# Patient Record
Sex: FEMALE | Race: BLACK OR AFRICAN AMERICAN | NOT HISPANIC OR LATINO | Employment: STUDENT | URBAN - METROPOLITAN AREA
[De-identification: names, ages, dates, MRNs, and addresses within clinical notes are randomized per-mention and may not be internally consistent; named-entity substitution may affect disease eponyms.]

---
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
## 2024-01-02 ENCOUNTER — HOSPITAL ENCOUNTER (OUTPATIENT)
Dept: RADIOLOGY | Facility: HOSPITAL | Age: 19
Discharge: HOME | End: 2024-01-02
Attending: PODIATRIST
Payer: COMMERCIAL

## 2024-01-02 ENCOUNTER — OFFICE VISIT (OUTPATIENT)
Dept: PODIATRY | Facility: CLINIC | Age: 19
End: 2024-01-02
Payer: COMMERCIAL

## 2024-01-02 VITALS — WEIGHT: 217 LBS | BODY MASS INDEX: 34.87 KG/M2 | HEIGHT: 66 IN

## 2024-01-02 DIAGNOSIS — Z09 POSTOP CHECK: ICD-10-CM

## 2024-01-02 DIAGNOSIS — M25.871 SESAMOIDITIS OF RIGHT FOOT: ICD-10-CM

## 2024-01-02 DIAGNOSIS — M79.674 PAIN OF RIGHT GREAT TOE: ICD-10-CM

## 2024-01-02 DIAGNOSIS — Z09 POSTOP CHECK: Primary | ICD-10-CM

## 2024-01-02 PROCEDURE — 73630 X-RAY EXAM OF FOOT: CPT | Mod: 26,RT,, | Performed by: RADIOLOGY

## 2024-01-02 PROCEDURE — 99999 PR PBB SHADOW E&M-EST. PATIENT-LVL III: CPT | Mod: PBBFAC,,, | Performed by: PODIATRIST

## 2024-01-02 PROCEDURE — 73630 X-RAY EXAM OF FOOT: CPT | Mod: TC,RT

## 2024-01-02 PROCEDURE — 99024 POSTOP FOLLOW-UP VISIT: CPT | Mod: S$GLB,,, | Performed by: PODIATRIST

## 2024-01-02 NOTE — PROGRESS NOTES
"Subjective:       Patient ID: Lisa De Jesus is a 18 y.o. female.    Chief Complaint: Post-op Evaluation (Post op on right foot, no pain, pt is wearing post op shoe on right foot and tennis shoes on left, nondiabetic)      HPI:  Lisa De Jesus presents to the office today, s/p 12/15/23 RLE medial sesamoid excision. WB with Sx. Shoe and/or tall CAM Walker. Does have [employer].     No results found for: "HGBA1C"    Review of patient's allergies indicates:   Allergen Reactions    Tretinoin Swelling     Facial swelling with retinoid creams        History reviewed. No pertinent past medical history.    History reviewed. No pertinent family history.    Social History     Socioeconomic History    Marital status: Single   Tobacco Use    Smoking status: Never    Smokeless tobacco: Never   Substance and Sexual Activity    Alcohol use: Never    Drug use: Never    Sexual activity: Never       Past Surgical History:   Procedure Laterality Date    FINGER SURGERY Left 2016    5th finger    SESAMOIDECTOMY Right 12/15/2023    Procedure: SESAMOIDECTOMY;  Surgeon: Wojciech Rich DPM;  [facility];  Service: Podiatry;  Laterality: Right;       Review of Systems   Constitutional:  Negative for chills, fatigue and fever.   HENT:  Negative for hearing loss.    Eyes:  Negative for photophobia and visual disturbance.   Respiratory:  Negative for cough, chest tightness, shortness of breath and wheezing.    Cardiovascular:  Negative for chest pain and palpitations.   Gastrointestinal:  Negative for constipation, diarrhea, nausea and vomiting.   Endocrine: Negative for cold intolerance and heat intolerance.   Genitourinary:  Negative for flank pain.   Musculoskeletal:  Negative for neck pain and neck stiffness.   Neurological:  Negative for light-headedness and headaches.   Psychiatric/Behavioral:  Negative for sleep disturbance.           Objective:   Ht 5' 6" (1.676 m)   Wt 98.4 kg (217 lb)   BMI 35.02 kg/m² "       Physical Exam  LOWER EXTREMITY PHYSICAL EXAMINATION  DERMATOLOGY: No evidence of wound healing complications are noted.     ORTHOPEDIC: Moderate edema is noted. Mild HAV is noted. Mild to moderate discomfort to palpation.      Assessment:     1. Postop check    2. Sesamoiditis of right foot    3. Pain of right great toe          Plan:     Postop check  -     X-Ray Foot Complete Right; Future; Expected date: 01/02/2024    Sesamoiditis of right foot    Pain of right great toe  -     X-Ray Foot Complete Right; Future; Expected date: 01/02/2024      Thorough discussion is had with the patient today, concerning the diagnosis, its etiology, and the treatment algorithm at present.     Sutures are D/C at present.    Start BID wound care with MediHoney and large bandage.    WBAT with Sx Shoe or CAM Walker until at least 2/1/24.    RICE therapy.    NSAIDs as needed.     May start to shower on tomorrow afternoon.    Patient will start/continue BID warm water Epson salt soaks for duration of 15-20 minutes each time (verify water is of the appropriate temperature).     Patient will perform appropriate wound care with MediHoney thereafter.             No future appointments.

## 2024-01-22 ENCOUNTER — PATIENT MESSAGE (OUTPATIENT)
Dept: PODIATRY | Facility: CLINIC | Age: 19
End: 2024-01-22
Payer: COMMERCIAL

## 2024-01-22 DIAGNOSIS — T81.89XA DELAYED SURGICAL WOUND HEALING, INITIAL ENCOUNTER: Primary | ICD-10-CM

## 2024-01-23 DIAGNOSIS — B37.9 ANTIBIOTIC-INDUCED YEAST INFECTION: ICD-10-CM

## 2024-01-23 DIAGNOSIS — T81.89XA DELAYED SURGICAL WOUND HEALING, INITIAL ENCOUNTER: Primary | ICD-10-CM

## 2024-01-23 DIAGNOSIS — T36.95XA ANTIBIOTIC-INDUCED YEAST INFECTION: ICD-10-CM

## 2024-01-23 RX ORDER — LEVOFLOXACIN 750 MG/1
750 TABLET ORAL DAILY
Qty: 7 TABLET | Refills: 0 | Status: SHIPPED | OUTPATIENT
Start: 2024-01-23 | End: 2024-01-30

## 2024-01-23 RX ORDER — FLUCONAZOLE 150 MG/1
150 TABLET ORAL
Qty: 2 TABLET | Refills: 0 | Status: SHIPPED | OUTPATIENT
Start: 2024-01-23 | End: 2024-01-27

## 2024-01-23 RX ORDER — SULFAMETHOXAZOLE AND TRIMETHOPRIM 800; 160 MG/1; MG/1
1 TABLET ORAL 2 TIMES DAILY
Qty: 20 TABLET | Refills: 0 | Status: SHIPPED | OUTPATIENT
Start: 2024-01-23 | End: 2024-02-02

## 2024-01-23 RX ORDER — MUPIROCIN 20 MG/G
OINTMENT TOPICAL 2 TIMES DAILY
Qty: 30 G | Refills: 1 | Status: SHIPPED | OUTPATIENT
Start: 2024-01-23 | End: 2024-02-22

## 2024-01-29 ENCOUNTER — OFFICE VISIT (OUTPATIENT)
Dept: PODIATRY | Facility: CLINIC | Age: 19
End: 2024-01-29
Payer: COMMERCIAL

## 2024-01-29 ENCOUNTER — EXTERNAL HOME HEALTH (OUTPATIENT)
Dept: HOME HEALTH SERVICES | Facility: HOSPITAL | Age: 19
End: 2024-01-29
Payer: COMMERCIAL

## 2024-01-29 DIAGNOSIS — M25.871 SESAMOIDITIS OF RIGHT FOOT: ICD-10-CM

## 2024-01-29 DIAGNOSIS — M79.674 PAIN OF RIGHT GREAT TOE: ICD-10-CM

## 2024-01-29 DIAGNOSIS — Z09 POSTOP CHECK: Primary | ICD-10-CM

## 2024-01-29 DIAGNOSIS — T81.89XA DELAYED SURGICAL WOUND HEALING, INITIAL ENCOUNTER: ICD-10-CM

## 2024-01-29 PROCEDURE — 99999 PR PBB SHADOW E&M-EST. PATIENT-LVL II: CPT | Mod: PBBFAC,,, | Performed by: PODIATRIST

## 2024-01-29 PROCEDURE — 99024 POSTOP FOLLOW-UP VISIT: CPT | Mod: S$GLB,,, | Performed by: PODIATRIST

## 2024-01-29 PROCEDURE — 87070 CULTURE OTHR SPECIMN AEROBIC: CPT | Performed by: PODIATRIST

## 2024-01-29 PROCEDURE — 87075 CULTR BACTERIA EXCEPT BLOOD: CPT | Performed by: PODIATRIST

## 2024-01-29 NOTE — PROGRESS NOTES
"Subjective:       Patient ID: Lisa De Jesus is a 18 y.o. female.    Chief Complaint: Follow-up      HPI:  Lisa De Jesus presents to the office today, s/p 12/15/23 RLE medial sesamoid excision. WB with CAM Walker. Did notify me on last week concerning a wound healing complication. I did Rx. Bactroban + Bactrim + Levaquin. States improved appearance since last Tuesday PM.     No results found for: "HGBA1C"    Review of patient's allergies indicates:   Allergen Reactions    Tretinoin Swelling     Facial swelling with retinoid creams        History reviewed. No pertinent past medical history.    History reviewed. No pertinent family history.    Social History     Socioeconomic History    Marital status: Single   Tobacco Use    Smoking status: Never    Smokeless tobacco: Never   Substance and Sexual Activity    Alcohol use: Never    Drug use: Never    Sexual activity: Never       Past Surgical History:   Procedure Laterality Date    FINGER SURGERY Left 2016    5th finger    SESAMOIDECTOMY Right 12/15/2023    Procedure: SESAMOIDECTOMY;  Surgeon: Wojciech Rich DPM;  Location: [facility];  Service: Podiatry;  Laterality: Right;       Review of Systems   Constitutional:  Negative for chills, fatigue and fever.   HENT:  Negative for hearing loss.    Eyes:  Negative for photophobia and visual disturbance.   Respiratory:  Negative for cough, chest tightness, shortness of breath and wheezing.    Cardiovascular:  Negative for chest pain and palpitations.   Gastrointestinal:  Negative for constipation, diarrhea, nausea and vomiting.   Endocrine: Negative for cold intolerance and heat intolerance.   Genitourinary:  Negative for flank pain.   Musculoskeletal:  Negative for neck pain and neck stiffness.   Neurological:  Negative for light-headedness and headaches.   Psychiatric/Behavioral:  Negative for sleep disturbance.           Objective:   There were no vitals taken for this visit.          LOWER EXTREMITY PHYSICAL " EXAMINATION  DERMATOLOGY: Delayed healing surgical wound is noted. No overt infection is noted. No malodor is noted. No deep probing is noted. No bone is noted or palpation. No drainage is noted.     ORTHOPEDIC: Moderate edema is noted. Mild HAV is noted. Mild discomfort to palpation.      Assessment:     1. Postop check    2. Sesamoiditis of right foot    3. Pain of right great toe    4. Delayed surgical wound healing, initial encounter          Plan:     Postop check    Sesamoiditis of right foot    Pain of right great toe    Delayed surgical wound healing, initial encounter      Thorough discussion is had with the patient today, concerning the diagnosis, its etiology, and the treatment algorithm at present.     The wound was surgically debrided after adequate prep with alcohol and/or betadine paint. Excisional wound debridement was performed using sharp #10/#15 blade/rounded scalpel and tissue nipper, with removal of all non-viable skin and soft tissues; necrotic skin/tissue formation. The woundbase/wound bed was also debrided to encourage bleeding as to promote/stimulate healing. Debridement was excisional and included epidermal, dermal and subcutaneous tissues. Post debridement measurements are as above. Hemostasis was achieved. Patient tolerated procedure well and without complications. Local woundcare with Hydrogel and Collagen dressings and bandage thereafter.     Maintain dressings until Thursday PM and change with Bactroban alone or Bactroban + Collagen.    WBAT with CAM Walker.    Continue PO Abx.    Do not get wet in the shower.    NSAIDs as needed.           No future appointments.

## 2024-02-02 LAB — BACTERIA SPEC AEROBE CULT: NO GROWTH

## 2024-02-04 LAB — BACTERIA SPEC ANAEROBE CULT: NORMAL

## 2024-02-15 DIAGNOSIS — M25.871 SESAMOIDITIS OF RIGHT FOOT: Primary | ICD-10-CM

## 2024-02-15 DIAGNOSIS — M79.674 PAIN OF RIGHT GREAT TOE: ICD-10-CM

## 2024-02-15 DIAGNOSIS — Z09 POSTOP CHECK: ICD-10-CM

## 2024-03-12 ENCOUNTER — OFFICE VISIT (OUTPATIENT)
Dept: PODIATRY | Facility: CLINIC | Age: 19
End: 2024-03-12
Payer: COMMERCIAL

## 2024-03-12 ENCOUNTER — HOSPITAL ENCOUNTER (OUTPATIENT)
Dept: RADIOLOGY | Facility: HOSPITAL | Age: 19
Discharge: HOME | End: 2024-03-12
Attending: PODIATRIST
Payer: COMMERCIAL

## 2024-03-12 VITALS — WEIGHT: 217 LBS | HEIGHT: 66 IN | BODY MASS INDEX: 34.87 KG/M2

## 2024-03-12 DIAGNOSIS — M25.871 SESAMOIDITIS OF RIGHT FOOT: ICD-10-CM

## 2024-03-12 DIAGNOSIS — L90.5 SCAR TISSUE: ICD-10-CM

## 2024-03-12 DIAGNOSIS — Z09 POSTOP CHECK: Primary | ICD-10-CM

## 2024-03-12 DIAGNOSIS — M25.572 PAIN IN JOINT INVOLVING LEFT ANKLE AND FOOT: ICD-10-CM

## 2024-03-12 DIAGNOSIS — M79.674 PAIN OF RIGHT GREAT TOE: ICD-10-CM

## 2024-03-12 DIAGNOSIS — S93.422A SPRAIN OF DELTOID LIGAMENT OF LEFT ANKLE, INITIAL ENCOUNTER: ICD-10-CM

## 2024-03-12 PROCEDURE — 99999 PR PBB SHADOW E&M-EST. PATIENT-LVL III: CPT | Mod: PBBFAC,,, | Performed by: PODIATRIST

## 2024-03-12 PROCEDURE — 99024 POSTOP FOLLOW-UP VISIT: CPT | Mod: S$GLB,,, | Performed by: PODIATRIST

## 2024-03-12 PROCEDURE — 73610 X-RAY EXAM OF ANKLE: CPT | Mod: TC,LT

## 2024-03-12 PROCEDURE — 73610 X-RAY EXAM OF ANKLE: CPT | Mod: 26,LT,, | Performed by: RADIOLOGY

## 2024-03-12 NOTE — PROGRESS NOTES
"Subjective:       Patient ID: Lisa De Jesus is a 19 y.o. female.    Chief Complaint: Post-op Evaluation (Post op, pt was last seen by PCP Daly Rubalcava NP at 11/21/2023, rates pain 3, nondiabetic)      HPI:  Lisa De Jesus presents to the office today, s/p 12/15/23 RLE medial sesamoid excision. WB with normal Walker. States the surgical incision is healed and resolved. States mild to moderate edema with prolonged usage and ROM. Has not started PT at this juncture. Also states new onset LLE medial ankle pains. States frequent LLE medial ankle sprains. Did stop wearing the CAM Walker on the RLE on last week.       No results found for: "HGBA1C"    Review of patient's allergies indicates:   Allergen Reactions    Tretinoin Swelling     Facial swelling with retinoid creams        No past medical history on file.    No family history on file.    Social History     Socioeconomic History    Marital status: Single   Tobacco Use    Smoking status: Never    Smokeless tobacco: Never   Substance and Sexual Activity    Alcohol use: Never    Drug use: Never    Sexual activity: Never       Past Surgical History:   Procedure Laterality Date    FINGER SURGERY Left 2016    5th finger    SESAMOIDECTOMY Right 12/15/2023    Procedure: SESAMOIDECTOMY;  Surgeon: Wojciech Rich DPM;  Location: [facility];  Service: Podiatry;  Laterality: Right;       Review of Systems   Constitutional:  Negative for chills, fatigue and fever.   HENT:  Negative for hearing loss.    Eyes:  Negative for photophobia and visual disturbance.   Respiratory:  Negative for cough, chest tightness, shortness of breath and wheezing.    Cardiovascular:  Negative for chest pain and palpitations.   Gastrointestinal:  Negative for constipation, diarrhea, nausea and vomiting.   Endocrine: Negative for cold intolerance and heat intolerance.   Genitourinary:  Negative for flank pain.   Musculoskeletal:  Positive for gait problem. Negative for neck pain and neck " "stiffness.   Neurological:  Negative for light-headedness and headaches.   Psychiatric/Behavioral:  Negative for sleep disturbance.           Objective:   Ht 5' 6" (1.676 m)   Wt 98.4 kg (217 lb)   BMI 35.02 kg/m²                     LOWER EXTREMITY PHYSICAL EXAMINATION  DERMATOLOGY: Incision site is healed at the RLE 1st MTPJ. Minimal scar tissues are noted.     ORTHOPEDIC (RLE): Minimal 1st MTPJ edema is noted. Mild HAV is noted.     ORTHOPEDIC (LLE): There is no tenderness to palpation of the medial malleolus. There is no tenderness to palpation of the lateral malleolus. There is no tenderness to palpation of the ATFL. There is no pain to palpation of the CFL. There is no pain to palpation of the PFL. There is mild tenderness to palpation of the deltoid ligament complex. Compression of the tibia and fibula at the mid-calf does not produce pains at the distal ankle articulation to suggestion high ankle sprain. There is no tenderness to palpation of the 5th met. base. There is no tenderness to palpation of the navicular tuberosity. There is no to palpation along the course of the PB and PL tendons. There is no edema noted to this area. There is no retro-malleolar edema.  No subluxing peroneals are noted. There is severe tenderness to palpation of the course of the posterior tibial tendon. There is no edema noted along its course. There is no tenderness to palpation of the anterior lateral ankle gutter. There is no tenderness to palpation of the anterior medial ankle gutter. Anterior Drawer Testing is WNL. Stress valgus and varus ankle testing is WNL. There is no pain to the area of the sinus tarsi. Rectus  foot type is noted. No palpable foot or ankle fractures are noted. MMT is painful with inversion and eversion with and without resistance is decreased as compared to the contra-lateral. MMT is decreased on the left as compared to the contra-lateral. Patient is able to bear weight. Gait pattern is " antalgic.      Assessment:     1. Postop check    2. Sesamoiditis of right foot    3. Pain of right great toe    4. Scar tissue    5. Pain in joint involving left ankle and foot    6. Sprain of deltoid ligament of left ankle, initial encounter            Plan:     Postop check    Sesamoiditis of right foot    Pain of right great toe    Scar tissue    Pain in joint involving left ankle and foot  -     X-Ray Ankle Complete Left; Future; Expected date: 03/12/2024    Sprain of deltoid ligament of left ankle, initial encounter        Thorough discussion is had with the patient today, concerning the diagnosis, its etiology, and the treatment algorithm at present.     Will consider Triamcinolone injection to the RLE 1st MTPJ scar tissue once patient makes it to 6 months s/p.    XRAY of the Left Ankle to assess for deltoid pathology.    Consider PT/OT for the B/L LE.    Tylenol or NSAIDs prn.    Sensation typically takes 6-9 months to return s/p operation on an extremity (hand/wrist/foot).    If patient is going to participate in a 5K, would certainly purchase OTC orthotics with [employer].    If at home therapy is preferred, would tailor towards PT exercises that are used to treat 1st MTPJ TURF TOE.        No future appointments.

## 2024-05-13 ENCOUNTER — OFFICE VISIT (OUTPATIENT)
Dept: DERMATOLOGY | Facility: CLINIC | Age: 19
End: 2024-05-13
Payer: COMMERCIAL

## 2024-05-13 DIAGNOSIS — L81.9 DYSCHROMIA: ICD-10-CM

## 2024-05-13 DIAGNOSIS — L70.0 COMEDONAL ACNE: Primary | ICD-10-CM

## 2024-05-13 DIAGNOSIS — L90.6 STRIA ATROPHICA: ICD-10-CM

## 2024-05-13 DIAGNOSIS — L73.0 ACNE SCARRING: ICD-10-CM

## 2024-05-13 PROCEDURE — 99214 OFFICE O/P EST MOD 30 MIN: CPT | Mod: 95,,, | Performed by: PHYSICIAN ASSISTANT

## 2024-05-13 RX ORDER — ADAPALENE 0.1 G/100G
CREAM TOPICAL NIGHTLY
Qty: 45 G | Refills: 5 | Status: SHIPPED | OUTPATIENT
Start: 2024-05-13

## 2024-05-13 NOTE — PROGRESS NOTES
"  Subjective:      Patient ID:  Lisa De Jesus is a 19 y.o. female who presents for No chief complaint on file.    The patient location is: [place], [place]  The chief complaint leading to consultation is: texture/ roughness of cheeks    Visit type: audiovisual    Face to Face time with patient: 10  15 minutes of total time spent on the encounter, which includes face to face time and non-face to face time preparing to see the patient (eg, review of tests), Obtaining and/or reviewing separately obtained history, Documenting clinical information in the electronic or other health record, Independently interpreting results (not separately reported) and communicating results to the patient/family/caregiver, or Care coordination (not separately reported).         Each patient to whom he or she provides medical services by telemedicine is:  (1) informed of the relationship between the physician and patient and the respective role of any other health care provider with respect to management of the patient; and (2) notified that he or she may decline to receive medical services by telemedicine and may withdraw from such care at any time.    Notes:     C/o "texture" of cheeks. Duration several years. Used to have more frequent acne flares, now better controlled. Previously completed course of accutane several  years ago. Concerns now about "tiny craters" of skin and residual discoloration. No current tx.     C/o stretch marks of skin on the trunk. First noticed in January 2024, but were more red at that time. Recent foot surgery with limited mobility around time of onset.         Review of Systems   Constitutional:  Negative for fever and chills.   Gastrointestinal:  Negative for nausea and vomiting.   Skin:  Positive for dry skin and activity-related sunscreen use. Negative for itching, rash, sun sensitivity, daily sunscreen use, recent sunburn and dry lips.   Hematologic/Lymphatic: Does not bruise/bleed easily. "       Objective:   Physical Exam   Constitutional: She appears well-developed and well-nourished. No distress.   Neurological: She is alert and oriented to person, place, and time. She is not disoriented.   Psychiatric: She has a normal mood and affect.   Skin:   Areas Examined (abnormalities noted in diagram):   Head / Face Inspection Performed  Neck Inspection Performed                 Diagram Legend     Erythematous scaling macule/papule c/w actinic keratosis       Vascular papule c/w angioma      Pigmented verrucoid papule/plaque c/w seborrheic keratosis      Yellow umbilicated papule c/w sebaceous hyperplasia      Irregularly shaped tan macule c/w lentigo     1-2 mm smooth white papules consistent with Milia      Movable subcutaneous cyst with punctum c/w epidermal inclusion cyst      Subcutaneous movable cyst c/w pilar cyst      Firm pink to brown papule c/w dermatofibroma      Pedunculated fleshy papule(s) c/w skin tag(s)      Evenly pigmented macule c/w junctional nevus     Mildly variegated pigmented, slightly irregular-bordered macule c/w mildly atypical nevus      Flesh colored to evenly pigmented papule c/w intradermal nevus       Pink pearly papule/plaque c/w basal cell carcinoma      Erythematous hyperkeratotic cursted plaque c/w SCC      Surgical scar with no sign of skin cancer recurrence      Open and closed comedones      Inflammatory papules and pustules      Verrucoid papule consistent consistent with wart     Erythematous eczematous patches and plaques     Dystrophic onycholytic nail with subungual debris c/w onychomycosis     Umbilicated papule    Erythematous-base heme-crusted tan verrucoid plaque consistent with inflamed seborrheic keratosis     Erythematous Silvery Scaling Plaque c/w Psoriasis     See annotation                Assessment / Plan:        Comedonal acne  Dyschromia  -     adapalene (DIFFERIN) 0.1 % cream; Apply topically nightly. Apply a pea-sized amount to the entire face at  bedtime.  Use every third night and increase as tolerated to nightly.  Dispense: 45 g; Refill: 5  Mild, trial of above. Warned of dryness /irritation.    Acne scarring  May benefit from retinoid. Discussed cosmetic options as desired such as micro-channeling. Pt motivated for consultation w/Dr. Tapia- will message her staff.    Corazon atrophicaissatou  Discussed dx and tx options. Advised of association with sudden change in weight.  Advised discoloration typically improves over time.  May consider cosmetic options such as micr-channeling as desired. Warned of potential irritation / dryness w/retinoid.            Follow up in about 3 months (around 8/13/2024) for acne.

## 2024-05-13 NOTE — Clinical Note
Hi there,  This patient is interested in cosmetic consultation for acne scarring and possible micro-channeling.    Thanks! Deena

## 2024-05-14 ENCOUNTER — TELEPHONE (OUTPATIENT)
Dept: DERMATOLOGY | Facility: CLINIC | Age: 19
End: 2024-05-14
Payer: COMMERCIAL

## 2024-05-14 NOTE — TELEPHONE ENCOUNTER
----- Message from Deena Willoughby PA-C sent at 5/13/2024 10:20 AM CDT -----  Hi there,    This patient is interested in cosmetic consultation for acne scarring and possible micro-channeling.      Thanks!  Deena

## 2024-08-06 ENCOUNTER — OFFICE VISIT (OUTPATIENT)
Dept: DERMATOLOGY | Facility: CLINIC | Age: 19
End: 2024-08-06
Payer: COMMERCIAL

## 2024-08-06 VITALS — HEIGHT: 66 IN | BODY MASS INDEX: 34.86 KG/M2 | WEIGHT: 216.94 LBS

## 2024-08-06 DIAGNOSIS — L70.0 ACNE VULGARIS: ICD-10-CM

## 2024-08-06 DIAGNOSIS — L90.5 SCAR: Primary | ICD-10-CM

## 2024-08-06 PROCEDURE — G2211 COMPLEX E/M VISIT ADD ON: HCPCS | Mod: S$GLB,,, | Performed by: STUDENT IN AN ORGANIZED HEALTH CARE EDUCATION/TRAINING PROGRAM

## 2024-08-06 PROCEDURE — 99213 OFFICE O/P EST LOW 20 MIN: CPT | Mod: S$GLB,,, | Performed by: STUDENT IN AN ORGANIZED HEALTH CARE EDUCATION/TRAINING PROGRAM

## 2024-08-06 PROCEDURE — 99999 PR PBB SHADOW E&M-EST. PATIENT-LVL III: CPT | Mod: PBBFAC,,, | Performed by: STUDENT IN AN ORGANIZED HEALTH CARE EDUCATION/TRAINING PROGRAM

## 2024-08-07 ENCOUNTER — OFFICE VISIT (OUTPATIENT)
Dept: INTERNAL MEDICINE | Facility: CLINIC | Age: 19
End: 2024-08-07
Payer: COMMERCIAL

## 2024-08-07 ENCOUNTER — CLINICAL SUPPORT (OUTPATIENT)
Dept: INTERNAL MEDICINE | Facility: CLINIC | Age: 19
End: 2024-08-07
Payer: COMMERCIAL

## 2024-08-07 VITALS
HEIGHT: 66 IN | BODY MASS INDEX: 36.8 KG/M2 | SYSTOLIC BLOOD PRESSURE: 121 MMHG | HEART RATE: 83 BPM | DIASTOLIC BLOOD PRESSURE: 73 MMHG | RESPIRATION RATE: 18 BRPM | WEIGHT: 229 LBS | TEMPERATURE: 98 F

## 2024-08-07 DIAGNOSIS — M79.643 INTERMITTENT PAIN AND SWELLING OF HAND: ICD-10-CM

## 2024-08-07 DIAGNOSIS — R53.83 MALAISE AND FATIGUE: ICD-10-CM

## 2024-08-07 DIAGNOSIS — K59.00 CONSTIPATION, UNSPECIFIED CONSTIPATION TYPE: ICD-10-CM

## 2024-08-07 DIAGNOSIS — M79.89 INTERMITTENT PAIN AND SWELLING OF HAND: ICD-10-CM

## 2024-08-07 DIAGNOSIS — R63.5 WEIGHT GAIN: ICD-10-CM

## 2024-08-07 DIAGNOSIS — E03.9 ACQUIRED HYPOTHYROIDISM: ICD-10-CM

## 2024-08-07 DIAGNOSIS — K21.9 GASTROESOPHAGEAL REFLUX DISEASE WITHOUT ESOPHAGITIS: ICD-10-CM

## 2024-08-07 DIAGNOSIS — Z00.00 ROUTINE GENERAL MEDICAL EXAMINATION AT A HEALTH CARE FACILITY: Primary | ICD-10-CM

## 2024-08-07 DIAGNOSIS — Z00.00 ENCOUNTER FOR PREVENTIVE HEALTH EXAMINATION: Primary | ICD-10-CM

## 2024-08-07 DIAGNOSIS — I73.00 RAYNAUD'S DISEASE WITHOUT GANGRENE: ICD-10-CM

## 2024-08-07 DIAGNOSIS — T78.2XXD ANAPHYLAXIS, SUBSEQUENT ENCOUNTER: ICD-10-CM

## 2024-08-07 DIAGNOSIS — R53.81 MALAISE AND FATIGUE: ICD-10-CM

## 2024-08-07 LAB
ALBUMIN SERPL BCP-MCNC: 3.3 G/DL (ref 3.5–5.2)
ALP SERPL-CCNC: 71 U/L (ref 55–135)
ALT SERPL W/O P-5'-P-CCNC: 8 U/L (ref 10–44)
ANION GAP SERPL CALC-SCNC: 8 MMOL/L (ref 8–16)
AST SERPL-CCNC: 11 U/L (ref 10–40)
BILIRUB SERPL-MCNC: 0.2 MG/DL (ref 0.1–1)
BILIRUB UR QL STRIP: NEGATIVE
BUN SERPL-MCNC: 8 MG/DL (ref 6–20)
CALCIUM SERPL-MCNC: 9.2 MG/DL (ref 8.7–10.5)
CHLORIDE SERPL-SCNC: 108 MMOL/L (ref 95–110)
CHOLEST SERPL-MCNC: 159 MG/DL (ref 120–199)
CHOLEST/HDLC SERPL: 4.5 {RATIO} (ref 2–5)
CLARITY UR: ABNORMAL
CO2 SERPL-SCNC: 20 MMOL/L (ref 23–29)
COLOR UR: YELLOW
CREAT SERPL-MCNC: 0.7 MG/DL (ref 0.5–1.4)
CRP SERPL-MCNC: 46.3 MG/L (ref 0–8.2)
ERYTHROCYTE [DISTWIDTH] IN BLOOD BY AUTOMATED COUNT: 15 % (ref 11.5–14.5)
ERYTHROCYTE [SEDIMENTATION RATE] IN BLOOD BY PHOTOMETRIC METHOD: 75 MM/HR (ref 0–36)
EST. GFR  (NO RACE VARIABLE): >60 ML/MIN/1.73 M^2
ESTIMATED AVG GLUCOSE: 111 MG/DL (ref 68–131)
GLUCOSE SERPL-MCNC: 104 MG/DL (ref 70–110)
GLUCOSE UR QL STRIP: NEGATIVE
HBA1C MFR BLD: 5.5 % (ref 4–5.6)
HCT VFR BLD AUTO: 40.5 % (ref 37–48.5)
HCV AB SERPL QL IA: NORMAL
HDLC SERPL-MCNC: 35 MG/DL (ref 40–75)
HDLC SERPL: 22 % (ref 20–50)
HGB BLD-MCNC: 12.7 G/DL (ref 12–16)
HGB UR QL STRIP: ABNORMAL
HIV 1+2 AB+HIV1 P24 AG SERPL QL IA: NORMAL
KETONES UR QL STRIP: NEGATIVE
LDLC SERPL CALC-MCNC: 101.4 MG/DL (ref 63–159)
LEUKOCYTE ESTERASE UR QL STRIP: NEGATIVE
MCH RBC QN AUTO: 22.1 PG (ref 27–31)
MCHC RBC AUTO-ENTMCNC: 31.4 G/DL (ref 32–36)
MCV RBC AUTO: 71 FL (ref 82–98)
NITRITE UR QL STRIP: NEGATIVE
NONHDLC SERPL-MCNC: 124 MG/DL
PH UR STRIP: 6 [PH] (ref 5–8)
PLATELET # BLD AUTO: 351 K/UL (ref 150–450)
PMV BLD AUTO: 10.1 FL (ref 9.2–12.9)
POTASSIUM SERPL-SCNC: 3.8 MMOL/L (ref 3.5–5.1)
PROT SERPL-MCNC: 7.5 G/DL (ref 6–8.4)
PROT UR QL STRIP: NEGATIVE
RBC # BLD AUTO: 5.74 M/UL (ref 4–5.4)
SODIUM SERPL-SCNC: 136 MMOL/L (ref 136–145)
SP GR UR STRIP: 1.02 (ref 1–1.03)
TRIGL SERPL-MCNC: 113 MG/DL (ref 30–150)
TSH SERPL DL<=0.005 MIU/L-ACNC: 2.38 UIU/ML (ref 0.4–4)
URN SPEC COLLECT METH UR: ABNORMAL
WBC # BLD AUTO: 8.29 K/UL (ref 3.9–12.7)

## 2024-08-07 PROCEDURE — 85027 COMPLETE CBC AUTOMATED: CPT | Performed by: INTERNAL MEDICINE

## 2024-08-07 PROCEDURE — 86140 C-REACTIVE PROTEIN: CPT | Performed by: INTERNAL MEDICINE

## 2024-08-07 PROCEDURE — 99999 PR PBB SHADOW E&M-EST. PATIENT-LVL III: CPT | Mod: PBBFAC,,, | Performed by: INTERNAL MEDICINE

## 2024-08-07 PROCEDURE — 86038 ANTINUCLEAR ANTIBODIES: CPT | Performed by: INTERNAL MEDICINE

## 2024-08-07 PROCEDURE — 84443 ASSAY THYROID STIM HORMONE: CPT | Performed by: INTERNAL MEDICINE

## 2024-08-07 PROCEDURE — 99395 PREV VISIT EST AGE 18-39: CPT | Mod: S$GLB,,, | Performed by: INTERNAL MEDICINE

## 2024-08-07 PROCEDURE — 85652 RBC SED RATE AUTOMATED: CPT | Performed by: INTERNAL MEDICINE

## 2024-08-07 PROCEDURE — 83036 HEMOGLOBIN GLYCOSYLATED A1C: CPT | Performed by: INTERNAL MEDICINE

## 2024-08-07 PROCEDURE — 80053 COMPREHEN METABOLIC PANEL: CPT | Performed by: INTERNAL MEDICINE

## 2024-08-07 PROCEDURE — 80061 LIPID PANEL: CPT | Performed by: INTERNAL MEDICINE

## 2024-08-07 PROCEDURE — 87389 HIV-1 AG W/HIV-1&-2 AB AG IA: CPT | Performed by: INTERNAL MEDICINE

## 2024-08-07 PROCEDURE — 81003 URINALYSIS AUTO W/O SCOPE: CPT | Performed by: INTERNAL MEDICINE

## 2024-08-07 PROCEDURE — 86803 HEPATITIS C AB TEST: CPT | Performed by: INTERNAL MEDICINE

## 2024-08-07 RX ORDER — EPINEPHRINE 0.3 MG/.3ML
0.3 INJECTION SUBCUTANEOUS DAILY PRN
COMMUNITY
Start: 2024-03-14

## 2024-08-07 RX ORDER — PHENOL/SODIUM PHENOLATE
20 AEROSOL, SPRAY (ML) MUCOUS MEMBRANE DAILY
Start: 2024-08-07 | End: 2025-08-07

## 2024-08-07 RX ORDER — POLYETHYLENE GLYCOL 3350 17 G/17G
17 POWDER, FOR SOLUTION ORAL DAILY PRN
COMMUNITY
Start: 2024-02-17 | End: 2024-08-07

## 2024-08-07 RX ORDER — FLUTICASONE PROPIONATE 50 MCG
SPRAY, SUSPENSION (ML) NASAL
COMMUNITY
Start: 2024-03-11 | End: 2024-08-07

## 2024-08-08 ENCOUNTER — OFFICE VISIT (OUTPATIENT)
Dept: PODIATRY | Facility: CLINIC | Age: 19
End: 2024-08-08
Payer: COMMERCIAL

## 2024-08-08 ENCOUNTER — HOSPITAL ENCOUNTER (OUTPATIENT)
Dept: RADIOLOGY | Facility: HOSPITAL | Age: 19
Discharge: HOME | End: 2024-08-08
Attending: PODIATRIST
Payer: COMMERCIAL

## 2024-08-08 VITALS — BODY MASS INDEX: 36.81 KG/M2 | HEIGHT: 66 IN | WEIGHT: 229.06 LBS

## 2024-08-08 DIAGNOSIS — M25.561 CHRONIC PAIN OF BOTH KNEES: ICD-10-CM

## 2024-08-08 DIAGNOSIS — M25.562 CHRONIC PAIN OF BOTH KNEES: ICD-10-CM

## 2024-08-08 DIAGNOSIS — D89.89 AUTOIMMUNE DISORDER: ICD-10-CM

## 2024-08-08 DIAGNOSIS — M25.551 HIP PAIN, BILATERAL: ICD-10-CM

## 2024-08-08 DIAGNOSIS — M25.561 CHRONIC PAIN OF BOTH KNEES: Primary | ICD-10-CM

## 2024-08-08 DIAGNOSIS — M25.572 CHRONIC PAIN OF BOTH ANKLES: ICD-10-CM

## 2024-08-08 DIAGNOSIS — M25.552 HIP PAIN, BILATERAL: ICD-10-CM

## 2024-08-08 DIAGNOSIS — G89.29 CHRONIC PAIN OF BOTH KNEES: ICD-10-CM

## 2024-08-08 DIAGNOSIS — M79.671 FOOT PAIN, BILATERAL: ICD-10-CM

## 2024-08-08 DIAGNOSIS — M25.562 CHRONIC PAIN OF BOTH KNEES: Primary | ICD-10-CM

## 2024-08-08 DIAGNOSIS — G89.29 CHRONIC PAIN OF BOTH ANKLES: ICD-10-CM

## 2024-08-08 DIAGNOSIS — M79.672 FOOT PAIN, BILATERAL: ICD-10-CM

## 2024-08-08 DIAGNOSIS — G89.29 CHRONIC PAIN OF BOTH KNEES: Primary | ICD-10-CM

## 2024-08-08 DIAGNOSIS — M25.571 CHRONIC PAIN OF BOTH ANKLES: ICD-10-CM

## 2024-08-08 LAB — ANA SER QL IF: NORMAL

## 2024-08-08 PROCEDURE — 73610 X-RAY EXAM OF ANKLE: CPT | Mod: TC,50

## 2024-08-08 PROCEDURE — 73630 X-RAY EXAM OF FOOT: CPT | Mod: 26,50,, | Performed by: RADIOLOGY

## 2024-08-08 PROCEDURE — 73562 X-RAY EXAM OF KNEE 3: CPT | Mod: TC,50

## 2024-08-08 PROCEDURE — 73521 X-RAY EXAM HIPS BI 2 VIEWS: CPT | Mod: TC

## 2024-08-08 PROCEDURE — 99214 OFFICE O/P EST MOD 30 MIN: CPT | Mod: S$GLB,,, | Performed by: PODIATRIST

## 2024-08-08 PROCEDURE — 73610 X-RAY EXAM OF ANKLE: CPT | Mod: 26,50,, | Performed by: RADIOLOGY

## 2024-08-08 PROCEDURE — 73521 X-RAY EXAM HIPS BI 2 VIEWS: CPT | Mod: 26,,, | Performed by: RADIOLOGY

## 2024-08-08 PROCEDURE — 73630 X-RAY EXAM OF FOOT: CPT | Mod: TC,50

## 2024-08-08 PROCEDURE — 73562 X-RAY EXAM OF KNEE 3: CPT | Mod: 26,50,, | Performed by: RADIOLOGY

## 2024-08-08 PROCEDURE — 99999 PR PBB SHADOW E&M-EST. PATIENT-LVL III: CPT | Mod: PBBFAC,,, | Performed by: PODIATRIST

## 2024-08-08 RX ORDER — PREDNISONE 20 MG/1
20 TABLET ORAL DAILY
Qty: 6 TABLET | Refills: 0 | Status: SHIPPED | OUTPATIENT
Start: 2024-08-08 | End: 2024-08-08

## 2024-08-08 RX ORDER — PREDNISONE 20 MG/1
20 TABLET ORAL DAILY
Qty: 6 TABLET | Refills: 0 | Status: SHIPPED | OUTPATIENT
Start: 2024-08-08 | End: 2024-08-14

## 2024-08-09 ENCOUNTER — PATIENT MESSAGE (OUTPATIENT)
Dept: INTERNAL MEDICINE | Facility: CLINIC | Age: 19
End: 2024-08-09
Payer: COMMERCIAL

## 2024-08-09 RX ORDER — LEVOTHYROXINE SODIUM 50 UG/1
50 TABLET ORAL
Qty: 30 TABLET | Refills: 11 | Status: SHIPPED | OUTPATIENT
Start: 2024-08-09 | End: 2025-08-09

## 2024-08-12 ENCOUNTER — LAB VISIT (OUTPATIENT)
Dept: LAB | Facility: HOSPITAL | Age: 19
End: 2024-08-12
Attending: INTERNAL MEDICINE
Payer: COMMERCIAL

## 2024-08-12 DIAGNOSIS — M79.89 INTERMITTENT PAIN AND SWELLING OF HAND: ICD-10-CM

## 2024-08-12 DIAGNOSIS — M79.643 INTERMITTENT PAIN AND SWELLING OF HAND: ICD-10-CM

## 2024-08-12 LAB
CCP AB SER IA-ACNC: 7.4 U/ML
RHEUMATOID FACT SERPL-ACNC: <13 IU/ML (ref 0–15)

## 2024-08-12 PROCEDURE — 36415 COLL VENOUS BLD VENIPUNCTURE: CPT | Performed by: INTERNAL MEDICINE

## 2024-08-12 PROCEDURE — 86200 CCP ANTIBODY: CPT | Performed by: INTERNAL MEDICINE

## 2024-08-12 PROCEDURE — 86431 RHEUMATOID FACTOR QUANT: CPT | Performed by: INTERNAL MEDICINE

## 2024-08-15 ENCOUNTER — PATIENT MESSAGE (OUTPATIENT)
Dept: PRIMARY CARE CLINIC | Facility: CLINIC | Age: 19
End: 2024-08-15
Payer: COMMERCIAL

## 2024-08-15 ENCOUNTER — TELEPHONE (OUTPATIENT)
Dept: PRIMARY CARE CLINIC | Facility: CLINIC | Age: 19
End: 2024-08-15
Payer: COMMERCIAL

## 2024-08-15 DIAGNOSIS — M79.671 RIGHT FOOT PAIN: Primary | ICD-10-CM

## 2024-08-15 DIAGNOSIS — M79.643 PAIN OF HAND, UNSPECIFIED LATERALITY: ICD-10-CM

## 2024-08-22 ENCOUNTER — PATIENT MESSAGE (OUTPATIENT)
Dept: RHEUMATOLOGY | Facility: CLINIC | Age: 19
End: 2024-08-22

## 2024-08-22 ENCOUNTER — OFFICE VISIT (OUTPATIENT)
Dept: RHEUMATOLOGY | Facility: CLINIC | Age: 19
End: 2024-08-22
Payer: COMMERCIAL

## 2024-08-22 VITALS
HEART RATE: 88 BPM | SYSTOLIC BLOOD PRESSURE: 119 MMHG | BODY MASS INDEX: 36.63 KG/M2 | HEIGHT: 66 IN | DIASTOLIC BLOOD PRESSURE: 82 MMHG | WEIGHT: 227.94 LBS

## 2024-08-22 DIAGNOSIS — Z51.81 MEDICATION MONITORING ENCOUNTER: ICD-10-CM

## 2024-08-22 DIAGNOSIS — M05.9 SEROPOSITIVE RHEUMATOID ARTHRITIS: Primary | ICD-10-CM

## 2024-08-22 DIAGNOSIS — G56.02 CARPAL TUNNEL SYNDROME OF LEFT WRIST: ICD-10-CM

## 2024-08-22 DIAGNOSIS — I73.00 RAYNAUD'S DISEASE WITHOUT GANGRENE: ICD-10-CM

## 2024-08-22 DIAGNOSIS — M79.643 PAIN OF HAND, UNSPECIFIED LATERALITY: ICD-10-CM

## 2024-08-22 DIAGNOSIS — D84.821 IMMUNOSUPPRESSION DUE TO DRUG THERAPY: ICD-10-CM

## 2024-08-22 DIAGNOSIS — Z79.899 IMMUNOSUPPRESSION DUE TO DRUG THERAPY: ICD-10-CM

## 2024-08-22 PROCEDURE — 99999 PR PBB SHADOW E&M-EST. PATIENT-LVL IV: CPT | Mod: PBBFAC,,, | Performed by: INTERNAL MEDICINE

## 2024-08-22 RX ORDER — HYDROXYCHLOROQUINE SULFATE 200 MG/1
200 TABLET, FILM COATED ORAL 2 TIMES DAILY
Qty: 180 TABLET | Refills: 1 | Status: SHIPPED | OUTPATIENT
Start: 2024-08-22 | End: 2025-02-18

## 2024-08-22 RX ORDER — FOLIC ACID 1 MG/1
1 TABLET ORAL DAILY
Qty: 90 TABLET | Refills: 1 | Status: SHIPPED | OUTPATIENT
Start: 2024-08-22 | End: 2025-02-18

## 2024-08-22 RX ORDER — PREDNISONE 20 MG/1
20 TABLET ORAL DAILY
Qty: 14 TABLET | Refills: 0 | Status: SHIPPED | OUTPATIENT
Start: 2024-08-22 | End: 2024-09-05

## 2024-08-22 RX ORDER — METHOTREXATE 2.5 MG/1
12.5 TABLET ORAL
Qty: 20 TABLET | Refills: 3 | Status: SHIPPED | OUTPATIENT
Start: 2024-08-22 | End: 2024-09-21

## 2024-08-22 RX ORDER — SERTRALINE HYDROCHLORIDE 50 MG/1
50 TABLET, FILM COATED ORAL DAILY
COMMUNITY

## 2024-08-22 NOTE — PROGRESS NOTES
RHEUMATOLOGY OUTPATIENT CLINIC NOTE    8/22/2024    Attending Rheumatologist: Rubio Linares  Primary Care Provider/Physician Requesting Consultation: Kavita Smith NP   Chief Complaint/Reason For Consultation:  Hand Pain      Subjective:     Lisa De Jesus is a 19 y.o. Black or  female with elevated CCP and chronic arthralgias.    Recurrent hand and feet pain w/ mixed pain characteristics.  Describes association w/ pronged stiffness, joint swelling.    Review of Systems   Constitutional:  Positive for malaise/fatigue. Negative for fever.   Eyes:  Negative for photophobia and pain (No h xo fuveitis or scleritis.).   Respiratory:  Negative for cough and shortness of breath.    Cardiovascular:  Negative for chest pain.   Gastrointestinal:  Negative for blood in stool and melena.        No hx of IBD.  No hx of diverticulitis.    Genitourinary:  Negative for dysuria, hematuria and urgency.   Musculoskeletal:  Positive for back pain, joint pain and neck pain.   Skin:  Negative for rash.        No hx PsO.  Refers tri color RP, no ulcers   Neurological:  Negative for focal weakness and weakness.   Endo/Heme/Allergies:         No hx of DVT/PE.  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  Social History     Tobacco Use   Smoking Status Never   Smokeless Tobacco Never       Current Outpatient Medications:     adapalene (DIFFERIN) 0.1 % cream, Apply topically nightly. Apply a  pea-sized amount to the entire face at bedtime.  Use every third night and increase as tolerated to nightly., Disp: 45 g, Rfl: 5    BALCOLTRA 0.1 mg-0.02 mg (21)/36.5 mg(7) Tab, , Disp: , Rfl:     clindamycin (CLEOCIN T) 1 % lotion, Apply topically 2 (two) times daily., Disp: 60 mL, Rfl: 1    EPINEPHrine (EPIPEN) 0.3 mg/0.3 mL AtIn, Inject 0.3 mg into the muscle daily as needed., Disp: , Rfl:     levothyroxine (SYNTHROID) 50 MCG tablet, Take 1 tablet (50 mcg total) by mouth before breakfast., Disp: 30 tablet, Rfl: 11    naproxen (NAPROSYN) 500 MG tablet, Take 1 tablet by mouth twice daily, Disp: 60 tablet, Rfl: 0    omeprazole 20 mg TbEC, Take 20 mg by mouth once daily., Disp: , Rfl:     sertraline (ZOLOFT) 50 MG tablet, Take 50 mg by mouth once daily., Disp: , Rfl:     clindamycin (CLEOCIN T) 1 % external solution, Apply topically 2 (two) times daily. For hidradenitis suppurativa, Disp: 60 mL, Rfl: 2     Objective:     Vitals:    08/22/24 0800   BP: 119/82   Pulse: 88     Physical Exam   Eyes: Conjunctivae are normal.   Pulmonary/Chest: Effort normal. No respiratory distress.   Musculoskeletal:         General: Swelling and tenderness present. Normal range of motion.   Skin: No rash noted.       Reviewed available old and all outside pertinent medical records available.    All lab results personally reviewed and interpreted by me.       ASSESSMENT / PLAN     1. Seropositive rheumatoid arthritis  CDAI: high disease activity.  Initiate DMARD therapy w/ combination MTX and HCQ; monitor response at 12w  C-Reactive Protein    methotrexate 2.5 MG Tab    folic acid (FOLVITE) 1 MG tablet    hydroxychloroquine (PLAQUENIL) 200 mg tablet    predniSONE (DELTASONE) 20 MG tablet    X-Ray Hand Complete Bilateral      2. Medication monitoring encounter  Comprehensive Metabolic Panel      3. Immunosuppression due to drug therapy  Hold methotrexate in event of active infections, or desire of pregnancy (at least 3m prior)  CBC Auto  Differential    Hepatitis B Surface Antigen    Hepatitis B Core Antibody, Total    Quantiferon Gold TB      4. Pain of hand, unspecified laterality  Ambulatory referral/consult to Rheumatology      5. Carpal tunnel syndrome of left wrist  Night splint PRN.  Reassess after DMARD therapy.  Orthopedics PRN.      6. Raynaud's disease without gangrene  No trophic changes.  Avoid precipitating factors.  Monitor need for CCB.                Rubio Linares M.D.

## 2024-08-24 ENCOUNTER — PATIENT MESSAGE (OUTPATIENT)
Dept: PODIATRY | Facility: CLINIC | Age: 19
End: 2024-08-24
Payer: COMMERCIAL

## 2024-10-28 ENCOUNTER — LAB VISIT (OUTPATIENT)
Dept: LAB | Facility: HOSPITAL | Age: 19
End: 2024-10-28
Attending: INTERNAL MEDICINE
Payer: COMMERCIAL

## 2024-10-28 DIAGNOSIS — Z79.899 IMMUNOSUPPRESSION DUE TO DRUG THERAPY: ICD-10-CM

## 2024-10-28 DIAGNOSIS — D84.821 IMMUNOSUPPRESSION DUE TO DRUG THERAPY: ICD-10-CM

## 2024-10-28 DIAGNOSIS — Z51.81 MEDICATION MONITORING ENCOUNTER: ICD-10-CM

## 2024-10-28 LAB
ALBUMIN SERPL BCP-MCNC: 3.3 G/DL (ref 3.5–5.2)
ALP SERPL-CCNC: 82 U/L (ref 40–150)
ALT SERPL W/O P-5'-P-CCNC: 16 U/L (ref 10–44)
ANION GAP SERPL CALC-SCNC: 10 MMOL/L (ref 8–16)
AST SERPL-CCNC: 19 U/L (ref 10–40)
BASOPHILS # BLD AUTO: 0.06 K/UL (ref 0–0.2)
BASOPHILS NFR BLD: 0.7 % (ref 0–1.9)
BILIRUB SERPL-MCNC: 0.3 MG/DL (ref 0.1–1)
BUN SERPL-MCNC: 6 MG/DL (ref 6–20)
CALCIUM SERPL-MCNC: 8.7 MG/DL (ref 8.7–10.5)
CHLORIDE SERPL-SCNC: 108 MMOL/L (ref 95–110)
CO2 SERPL-SCNC: 20 MMOL/L (ref 23–29)
CREAT SERPL-MCNC: 0.8 MG/DL (ref 0.5–1.4)
DIFFERENTIAL METHOD BLD: ABNORMAL
EOSINOPHIL # BLD AUTO: 0.3 K/UL (ref 0–0.5)
EOSINOPHIL NFR BLD: 3.3 % (ref 0–8)
ERYTHROCYTE [DISTWIDTH] IN BLOOD BY AUTOMATED COUNT: 16.9 % (ref 11.5–14.5)
EST. GFR  (NO RACE VARIABLE): >60 ML/MIN/1.73 M^2
GLUCOSE SERPL-MCNC: 88 MG/DL (ref 70–110)
HCT VFR BLD AUTO: 39.1 % (ref 37–48.5)
HGB BLD-MCNC: 12.1 G/DL (ref 12–16)
IMM GRANULOCYTES # BLD AUTO: 0.03 K/UL (ref 0–0.04)
IMM GRANULOCYTES NFR BLD AUTO: 0.3 % (ref 0–0.5)
LYMPHOCYTES # BLD AUTO: 3.4 K/UL (ref 1–4.8)
LYMPHOCYTES NFR BLD: 36.8 % (ref 18–48)
MCH RBC QN AUTO: 21.7 PG (ref 27–31)
MCHC RBC AUTO-ENTMCNC: 30.9 G/DL (ref 32–36)
MCV RBC AUTO: 70 FL (ref 82–98)
MONOCYTES # BLD AUTO: 0.5 K/UL (ref 0.3–1)
MONOCYTES NFR BLD: 5.2 % (ref 4–15)
NEUTROPHILS # BLD AUTO: 4.9 K/UL (ref 1.8–7.7)
NEUTROPHILS NFR BLD: 53.7 % (ref 38–73)
NRBC BLD-RTO: 0 /100 WBC
PLATELET # BLD AUTO: 322 K/UL (ref 150–450)
PMV BLD AUTO: 9.7 FL (ref 9.2–12.9)
POTASSIUM SERPL-SCNC: 3.8 MMOL/L (ref 3.5–5.1)
PROT SERPL-MCNC: 7.3 G/DL (ref 6–8.4)
RBC # BLD AUTO: 5.57 M/UL (ref 4–5.4)
SODIUM SERPL-SCNC: 138 MMOL/L (ref 136–145)
WBC # BLD AUTO: 9.19 K/UL (ref 3.9–12.7)

## 2024-10-28 PROCEDURE — 80053 COMPREHEN METABOLIC PANEL: CPT | Performed by: INTERNAL MEDICINE

## 2024-10-28 PROCEDURE — 85025 COMPLETE CBC W/AUTO DIFF WBC: CPT | Performed by: INTERNAL MEDICINE

## 2024-10-28 PROCEDURE — 36415 COLL VENOUS BLD VENIPUNCTURE: CPT | Mod: PN | Performed by: INTERNAL MEDICINE

## 2024-11-12 ENCOUNTER — PATIENT MESSAGE (OUTPATIENT)
Dept: INTERNAL MEDICINE | Facility: CLINIC | Age: 19
End: 2024-11-12
Payer: COMMERCIAL

## 2024-11-12 DIAGNOSIS — R63.5 WEIGHT GAIN: Primary | ICD-10-CM

## 2024-11-14 ENCOUNTER — PATIENT MESSAGE (OUTPATIENT)
Dept: INTERNAL MEDICINE | Facility: CLINIC | Age: 19
End: 2024-11-14
Payer: COMMERCIAL

## 2024-11-20 ENCOUNTER — OFFICE VISIT (OUTPATIENT)
Dept: INTERNAL MEDICINE | Facility: CLINIC | Age: 19
End: 2024-11-20
Payer: COMMERCIAL

## 2024-11-20 VITALS
BODY MASS INDEX: 37.56 KG/M2 | DIASTOLIC BLOOD PRESSURE: 76 MMHG | HEIGHT: 66 IN | SYSTOLIC BLOOD PRESSURE: 104 MMHG | OXYGEN SATURATION: 97 % | TEMPERATURE: 98 F | RESPIRATION RATE: 18 BRPM | WEIGHT: 233.69 LBS | HEART RATE: 109 BPM

## 2024-11-20 DIAGNOSIS — R71.8 MICROCYTOSIS: ICD-10-CM

## 2024-11-20 DIAGNOSIS — R63.2 EXCESSIVE HUNGER: ICD-10-CM

## 2024-11-20 DIAGNOSIS — E03.9 ACQUIRED HYPOTHYROIDISM: Primary | ICD-10-CM

## 2024-11-20 DIAGNOSIS — E66.9 OBESITY (BMI 30-39.9): ICD-10-CM

## 2024-11-20 PROCEDURE — 99204 OFFICE O/P NEW MOD 45 MIN: CPT | Mod: S$GLB,,, | Performed by: FAMILY MEDICINE

## 2024-11-20 PROCEDURE — 99999 PR PBB SHADOW E&M-EST. PATIENT-LVL V: CPT | Mod: PBBFAC,,, | Performed by: FAMILY MEDICINE

## 2024-11-20 PROCEDURE — G2211 COMPLEX E/M VISIT ADD ON: HCPCS | Mod: S$GLB,,, | Performed by: FAMILY MEDICINE

## 2024-11-20 RX ORDER — TRAZODONE HYDROCHLORIDE 50 MG/1
25-50 TABLET ORAL NIGHTLY
COMMUNITY
Start: 2024-11-18

## 2024-11-23 NOTE — PROGRESS NOTES
Subjective:       Patient ID: Lisa De Jesus is a 19 y.o. female.    Chief Complaint: concerns (Thyroid, rheumatoid arthritis, weight)    History of Present Illness    CHIEF COMPLAINT:  Lisa presents to discuss recent weight gain, excessive hunger, and thyroid concerns, and to follow up on previous diagnoses of thyroid disease and rheumatoid arthritis.    HPI:  Lisa reports significant weight gain of 40-50 lbs since mid-2023, with her usual weight being around 170-175 lbs. She has constant hunger, even after eating. In August, she felt ill with digestive troubles and saw a gastroenterologist who suspected IBS but ordered labs. The results led to a referral to her PCP. The PCP's tests showed a low thyroid level and an elevated rheumatoid arthritis factor. She was prescribed levothyroxine for thyroid issues and referred to a rheumatologist.    The rheumatologist prescribed medication for rheumatoid arthritis as patient struggled with walking and had back issues. She took these medications consistently until October or November but continued to feel unwell with constant hunger. After discussing with her PCP, she stopped taking levothyroxine about a month ago due to concerns about elevated thyroid levels causing her symptoms. Her extreme hunger persisted after discontinuing the medication.    Lisa has bunions and pain in both legs and feet, leading to a podiatry consultation. The podiatrist suggested the bilateral nature of her pain might indicate an immune-related issue rather than a bone problem, prompting the rheumatology referral.    Lisa has had constipation since December of last year when she had surgery and a bowel blockage due to pain medication. She also reports easy bruising, noting that her last blood draw resulted in a large bruise lasting 3 weeks.    She sees an executive health plan primary care physician annually for comprehensive check-ups but due to age restrictions, she can only see this doctor once  a year and must seek additional care elsewhere.    Lisa denies issues with heart racing, stopping breathing during sleep, frequent urination, or diarrhea. She also denies having diabetes based on previous testing.    MEDICAL HISTORY:  Lisa has a history of thyroid disease and rheumatoid arthritis, both diagnosed in August. She also has bunions and a suspected diagnosis of Irritable Bowel Syndrome (IBS) from August. Lisa is not sexually active and is not using any form of contraception.    MEDICATIONS:  Lisa is on Methotrexate and Folic acid for rheumatoid arthritis.    SURGICAL HISTORY:  Lisa underwent an unspecified surgery in December of last year, which resulted in a bowel blockage due to pain medication.    TEST RESULTS:  Lisa's blood counts are stable with no evidence of anemia, although her RBC size is on the smaller side. Her platelets are normal, and both kidney and liver functions are great. Electrolytes are within normal limits. The cholesterol panel shows low HDL at 35, with other components being good. Her A1C is 5.5. Thyroid function from August was fine. Inflammatory markers and CCP antibody are elevated, consistent with rheumatoid arthritis. Urinalysis is fine.    ALLERGIES:  Lisa is al  lergic to tree nuts and trigonoid, though the specific reactions are not specified.    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 She is single and not currently dating.        Review of Systems   Constitutional:  Positive for unexpected weight change. Negative for chills and fever.   HENT:  Negative for congestion, rhinorrhea and sore throat.    Respiratory:  Negative for cough, shortness of breath and wheezing.    Cardiovascular:  Negative for chest pain, palpitations and leg swelling.   Gastrointestinal:  Negative for abdominal pain, constipation, diarrhea, nausea and vomiting.   Genitourinary:  Negative for dysuria, frequency and urgency.   Neurological:  Negative for headaches.  "  Psychiatric/Behavioral:  Negative for dysphoric mood.        Objective:      Physical Exam  Vitals reviewed.   HENT:      Head: Normocephalic and atraumatic.      Nose: No congestion or rhinorrhea.   Eyes:      General: No scleral icterus.        Right eye: No discharge.         Left eye: No discharge.      Extraocular Movements: Extraocular movements intact.      Conjunctiva/sclera: Conjunctivae normal.      Pupils: Pupils are equal, round, and reactive to light.   Cardiovascular:      Rate and Rhythm: Normal rate and regular rhythm.      Heart sounds: No murmur heard.     No friction rub. No gallop.   Pulmonary:      Effort: Pulmonary effort is normal. No respiratory distress.      Breath sounds: Normal breath sounds. No stridor. No wheezing or rhonchi.   Abdominal:      General: Bowel sounds are normal.      Palpations: Abdomen is soft.   Musculoskeletal:      Right lower leg: No edema.      Left lower leg: No edema.   Skin:     General: Skin is warm.   Neurological:      General: No focal deficit present.      Mental Status: She is alert.   Psychiatric:         Mood and Affect: Mood normal.         Behavior: Behavior normal.         Assessment/Plan:       1. Acquired hypothyroidism    2. Excessive hunger    3. Microcytosis    4. Obesity (BMI 30-39.9)      Assessment & Plan    Assessed thyroid function and determined levothyroxine likely unnecessary at this time  Considering Cushing's syndrome due to excess hunger and weight gain  Evaluating for potential autoimmune conditions given history of rheumatoid arthritis  Noted low HDL cholesterol (35)  Investigating cause of recent significant weight gain (40-50 lbs since mid-2023)  Evaluating for potential iron deficiency due to small RBC size  Assessing for cortisol issues and thyroid function  Considering thyroiditis as potential cause of past TSH fluctuations    LIPOPROTEIN DEFICIENCY:  - Explained HDL as "good cholesterol" that helps remove LDL ("bad " "cholesterol") from blood vessels.  - Described how fatty acids from healthy fish and lean proteins can help increase HDL levels.  - Lisa to increase physical activity to improve HDL cholesterol levels.  - Recommend modifying diet: reduce fried foods and foods elevated in trans fats, increase consumption of healthy fish and lean proteins (chicken, turkey), limit intake of red meats (steak, ground beef, pork), increase fiber intake through fresh fruits and vegetables.    CONSTIPATION:  - Started MiraLAX: 1 capful in non-carbonated beverage daily for constipation.  - Started Metamucil fiber gummies: 1 gummy daily for constipation, if needed.  - Lisa to increase water intake.    THYROID DISORDERS:  - Educated on thyroiditis and its potential impact on TSH levels after common illnesses.  - Ordered AM cortisol level, TSH, free T3, free T4, iron level, and hemoglobin A1C.    DIETARY COUNSELING:  - Recommend modifying diet: reduce fried foods and foods elevated in trans fats, increase consumption of healthy fish and lean proteins (chicken, turkey), limit intake of red meats (steak, ground beef, pork), increase fiber intake through fresh fruits and vegetables.    BIRTH CONTROL:  - Explained importance of birth control if sexually active while on current medications.    FOLLOW-UP:  - Follow up in 1 month after endocrinology appointment.  - Complete lab work after endocrinology appointment to minimize blood draws.              Future Appointments   Date Time Provider Department Center   11/25/2024  8:45 AM LAB, APPOINTMENT [facility] LAB [facility]   11/25/2024  9:00 AM Jac Cabrera MD [facility] [place]   12/20/2024  8:40 AM Valeria Muniz MD [facility] [place]   12/20/2024 10:30 AM ONROSA MARIA IRELAND[facility]-DR JEREMY [facility]   12/20/2024 11:10 AM LABORATORY, EDUARD [facility]   12/31/2024  3:30 PM Rubio Linares MD [facility] RHEUM [place]   2/7/2025 11:30 AM Bebeto Rashid MD [facility] DERM [facility] "       Valeria Muniz MD  [facility] [address]  [place] [address]  [phone]      This note was generated with the assistance of ambient listening technology. Verbal consent was obtained by the patient and accompanying visitor(s) for the recording of patient appointment to facilitate this note. I attest to having reviewed and edited the generated note for accuracy, though some syntax or spelling errors may persist. Please contact the author of this note for any clarification.

## 2024-11-24 NOTE — PROGRESS NOTES
NEW PATIENT VISIT    Subjective:      Chief Complaint:  Hypothyroidism and Recent Weight Gain    HPI: Lisa De Jesus is a 19 y.o. female who is here for hypothyroidism and recent weight gain    Patient reports that end of August her executive health provider put her on Levothyroxine at 50mcg.  She was having intense hunger pains and executive health told her to stop taking the Levothyroxine at that time.  Rheumatologist and [employer] provider stopped the Levothyroxine for concerns of weight gain caused by the increased hunger.  Patient would also like work-up performed for Cushing's per PCP recommendation.      Past Medical History:   Diagnosis Date    Anaphylaxis     to tree nuts    Gastroesophageal reflux disease without esophagitis 08/07/2024    Raynaud's disease without gangrene 08/07/2024     With regards to hypothyroidism:    FH of thyroid disease: Denies  FH of thyroid cancer: Denies  Personal history of FNA, JAEGER, thyroid surgery, or head/neck radiation treatment: Denies   TSH elevated at 5.970 per outside labs.      Lab Results   Component Value Date    TSH 2.384 08/07/2024     Current Medication:  Levothyroxine 50mcg daily - Patient reports taking consistently in the past.  Stopped due provider recommendation 10/31/2024.    Biotin Use: Denies    Reports recently weight gain of 30-40lbs since March 2024, fatigue, constipation, loss of hair to eye brows, eye lashes, and hair line, dry skin, mental fog, cold intolerance    Denies CP, palpitations, SOB    Plans for fertility: Denies.  On OCP    Last BMD dated: N/A    With Regards Weight Gain:    Denies abdominal pain, unusual sweating, weakness, panic attacks, acne, voice changes, menstrual irregularities,     Reports headaches, supraclavicular/dorsocervical fat pads, facial edema/erythema, facial rounding, and increased facial hair, increased bruising, appearance of abdominal striae over the last couple months.    H/o HTN: Denies   H/o fractures:  Denies   H/o DM/glucose intolerance: Reports prediabetic A1c     .  Objective:     Vitals:    11/25/24 0854   BP: 100/74   Pulse: 91       BP Readings from Last 5 Encounters:   11/25/24 100/74   11/20/24 104/76   08/22/24 119/82   08/07/24 121/73   12/15/23 129/63       Physical Exam  Constitutional:       Appearance: She is well-developed.   HENT:      Head: Normocephalic.   Eyes:      Conjunctiva/sclera: Conjunctivae normal.   Pulmonary:      Effort: Pulmonary effort is normal.   Abdominal:      Comments: Obese, striae    Musculoskeletal:         General: Normal range of motion.   Skin:     General: Skin is warm.   Neurological:      Mental Status: She is alert and oriented to person, place, and time.       Wt Readings from Last 3 Encounters:   11/25/24 0854 106.3 kg (234 lb 3.8 oz)   11/20/24 1131 106 kg (233 lb 11 oz)   08/22/24 0800 103.4 kg (227 lb 15.3 oz)       Assessment/Plan:     Acquired hypothyroidism  -- Patient chemically euthyroid at today's visit  -- History of elevated TSH (TSH 5.970 per outside labs.)  -- TPO - antibody ordered to rule out Hashimoto thyroiditis  -- Levothyroxine held at this time.       Weight gain  -- Dexamethasone suppression test ordered with instructions provided  -- If dexamethasone suppression test elevated will proceed with further testing:   Salivary cortisol    24 hour urine free cortisol         No follow-ups on file.    Case discussed with Dr. Cabrera.  Recommendations were discussed with the patient in detail.  The patient verbalized understanding and agrees with the plan outlined as above.

## 2024-11-24 NOTE — ASSESSMENT & PLAN NOTE
-- Patient chemically euthyroid at today's visit  -- History of elevated TSH (TSH 5.970 per outside labs.)  -- TPO - antibody ordered to rule out Hashimoto thyroiditis  -- Levothyroxine held at this time.

## 2024-11-25 ENCOUNTER — OFFICE VISIT (OUTPATIENT)
Dept: ENDOCRINOLOGY | Facility: CLINIC | Age: 19
End: 2024-11-25
Payer: COMMERCIAL

## 2024-11-25 VITALS
HEART RATE: 91 BPM | WEIGHT: 234.25 LBS | DIASTOLIC BLOOD PRESSURE: 74 MMHG | SYSTOLIC BLOOD PRESSURE: 100 MMHG | OXYGEN SATURATION: 99 % | BODY MASS INDEX: 37.81 KG/M2

## 2024-11-25 DIAGNOSIS — E66.812 CLASS 2 OBESITY WITH BODY MASS INDEX (BMI) OF 37.0 TO 37.9 IN ADULT, UNSPECIFIED OBESITY TYPE, UNSPECIFIED WHETHER SERIOUS COMORBIDITY PRESENT: ICD-10-CM

## 2024-11-25 DIAGNOSIS — E03.9 ACQUIRED HYPOTHYROIDISM: ICD-10-CM

## 2024-11-25 DIAGNOSIS — R63.5 WEIGHT GAIN: Primary | ICD-10-CM

## 2024-11-25 PROCEDURE — 99999 PR PBB SHADOW E&M-EST. PATIENT-LVL IV: CPT | Mod: PBBFAC,,, | Performed by: INTERNAL MEDICINE

## 2024-11-25 PROCEDURE — 99204 OFFICE O/P NEW MOD 45 MIN: CPT | Mod: S$GLB,,, | Performed by: INTERNAL MEDICINE

## 2024-11-25 PROCEDURE — G2211 COMPLEX E/M VISIT ADD ON: HCPCS | Mod: S$GLB,,, | Performed by: INTERNAL MEDICINE

## 2024-11-25 RX ORDER — DEXAMETHASONE 1 MG/1
1 TABLET ORAL ONCE
Qty: 1 TABLET | Refills: 0 | Status: SHIPPED | OUTPATIENT
Start: 2024-11-25 | End: 2024-11-25

## 2024-11-25 NOTE — ASSESSMENT & PLAN NOTE
-- Dexamethasone suppression test ordered with instructions provided  -- If dexamethasone suppression test elevated will proceed with further testing:   Salivary cortisol    24 hour urine free cortisol

## 2024-11-26 ENCOUNTER — LAB VISIT (OUTPATIENT)
Dept: LAB | Facility: HOSPITAL | Age: 19
End: 2024-11-26
Attending: INTERNAL MEDICINE
Payer: COMMERCIAL

## 2024-11-26 DIAGNOSIS — R63.5 WEIGHT GAIN: ICD-10-CM

## 2024-11-26 DIAGNOSIS — E03.9 ACQUIRED HYPOTHYROIDISM: ICD-10-CM

## 2024-11-26 LAB — CORTIS SERPL-MCNC: 1.9 UG/DL (ref 4.3–22.4)

## 2024-11-26 PROCEDURE — 82533 TOTAL CORTISOL: CPT | Performed by: INTERNAL MEDICINE

## 2024-11-26 PROCEDURE — 86376 MICROSOMAL ANTIBODY EACH: CPT | Performed by: INTERNAL MEDICINE

## 2024-11-26 PROCEDURE — 36415 COLL VENOUS BLD VENIPUNCTURE: CPT | Mod: PN | Performed by: INTERNAL MEDICINE

## 2024-11-26 PROCEDURE — 82542 COL CHROMOTOGRAPHY QUAL/QUAN: CPT | Performed by: INTERNAL MEDICINE

## 2024-11-26 NOTE — PROGRESS NOTES
I have seen the patient, reviewed the nurse practitioner's history and physical, assessment, plan, and progress note. I have personally interviewed and examined the patient at bedside and agree with the findings.     TSH mildly elevated initially, then normal more recently. Will recheck with TPO antibodies to determine if she has Hashimoto's.  Screen for Cushing syndrome with 1 mg DST noting that she is on estrogen containing OCPs. If she does not suppress, will need 24 hour urine and salivary cortisol measurements.    Jac Cabrera MD  Endocrinology Staff

## 2024-11-27 LAB — THYROPEROXIDASE IGG SERPL-ACNC: <6 IU/ML

## 2024-12-02 ENCOUNTER — OFFICE VISIT (OUTPATIENT)
Dept: INTERNAL MEDICINE | Facility: CLINIC | Age: 19
End: 2024-12-02
Payer: COMMERCIAL

## 2024-12-02 VITALS
OXYGEN SATURATION: 97 % | HEIGHT: 67 IN | BODY MASS INDEX: 36.79 KG/M2 | HEART RATE: 88 BPM | SYSTOLIC BLOOD PRESSURE: 110 MMHG | RESPIRATION RATE: 20 BRPM | TEMPERATURE: 97 F | WEIGHT: 234.38 LBS | DIASTOLIC BLOOD PRESSURE: 76 MMHG

## 2024-12-02 DIAGNOSIS — J00 ACUTE NASOPHARYNGITIS: Primary | ICD-10-CM

## 2024-12-02 PROBLEM — F41.9 ANXIETY: Status: ACTIVE | Noted: 2024-05-31

## 2024-12-02 PROBLEM — M79.671 RIGHT FOOT PAIN: Status: RESOLVED | Noted: 2023-11-21 | Resolved: 2024-12-02

## 2024-12-02 PROBLEM — R63.5 WEIGHT GAIN: Status: RESOLVED | Noted: 2024-11-25 | Resolved: 2024-12-02

## 2024-12-02 PROCEDURE — 99999 PR PBB SHADOW E&M-EST. PATIENT-LVL V: CPT | Mod: PBBFAC,,, | Performed by: PHYSICIAN ASSISTANT

## 2024-12-02 PROCEDURE — 99213 OFFICE O/P EST LOW 20 MIN: CPT | Mod: S$GLB,,, | Performed by: PHYSICIAN ASSISTANT

## 2024-12-02 RX ORDER — LEVOCETIRIZINE DIHYDROCHLORIDE 5 MG/1
5 TABLET, FILM COATED ORAL NIGHTLY
Qty: 30 TABLET | Refills: 0 | Status: SHIPPED | OUTPATIENT
Start: 2024-12-02

## 2024-12-02 RX ORDER — DEXBROMPHENIRAMINE MALEATE, PHENYLEPHRINE HYDROCHLORIDE 2; 7.5 MG/1; MG/1
1 TABLET ORAL
Qty: 20 TABLET | Refills: 0 | Status: SHIPPED | OUTPATIENT
Start: 2024-12-02

## 2024-12-02 RX ORDER — FLUTICASONE PROPIONATE 50 MCG
2 SPRAY, SUSPENSION (ML) NASAL DAILY
Qty: 16 G | Refills: 0 | Status: SHIPPED | OUTPATIENT
Start: 2024-12-02

## 2024-12-02 NOTE — PROGRESS NOTES
Subjective:       Patient ID: Lisa De Jesus is a 19 y.o. female.    Chief Complaint: Sore Throat (And itching)      Sore Throat   This is a new problem. The current episode started in the past 7 days. The problem has been waxing and waning. Neither side of throat is experiencing more pain than the other. There has been no fever. The pain is at a severity of 5/10. The pain is mild. Associated symptoms include congestion, coughing, ear pain, a hoarse voice, a plugged ear sensation and stridor. Pertinent negatives include no abdominal pain, diarrhea, drooling, ear discharge, headaches, neck pain, shortness of breath, swollen glands, trouble swallowing or vomiting. She has tried cool liquids, gargles and oral narcotic analgesics for the symptoms. The treatment provided mild relief.         Review of Systems   Constitutional:  Negative for chills and fever.   HENT:  Positive for congestion, ear pain, hoarse voice, postnasal drip, rhinorrhea and sore throat. Negative for drooling, ear discharge, sinus pressure, sinus pain and trouble swallowing.    Respiratory:  Positive for cough and stridor. Negative for shortness of breath and wheezing.    Cardiovascular:  Negative for chest pain.   Gastrointestinal:  Negative for abdominal pain, diarrhea, nausea and vomiting.   Musculoskeletal:  Negative for arthralgias, myalgias and neck pain.   Neurological:  Negative for headaches.       Objective:      Physical Exam  Vitals and nursing note reviewed.   Constitutional:       General: She is not in acute distress.     Appearance: She is well-developed.   HENT:      Head: Normocephalic and atraumatic.      Right Ear: Tympanic membrane, ear canal and external ear normal.      Left Ear: Tympanic membrane, ear canal and external ear normal.      Nose: Nose normal.      Mouth/Throat:      Lips: Pink.      Mouth: Mucous membranes are moist.      Pharynx: No pharyngeal swelling or posterior oropharyngeal erythema.      Comments: Clear  PND noted to posterior pharynx  Eyes:      General: Lids are normal. No scleral icterus.     Extraocular Movements: Extraocular movements intact.      Conjunctiva/sclera: Conjunctivae normal.   Cardiovascular:      Rate and Rhythm: Normal rate and regular rhythm.   Pulmonary:      Effort: Pulmonary effort is normal.      Breath sounds: Normal breath sounds. No decreased breath sounds, wheezing, rhonchi or rales.   Neurological:      Mental Status: She is alert.      Cranial Nerves: No cranial nerve deficit.   Psychiatric:         Mood and Affect: Mood and affect normal.         Assessment:       1. Acute nasopharyngitis        Plan:   1. Acute nasopharyngitis  -     levocetirizine (XYZAL) 5 MG tablet; Take 1 tablet (5 mg total) by mouth every evening.  Dispense: 30 tablet; Refill: 0  -     fluticasone propionate (FLONASE) 50 mcg/actuation nasal spray; 2 sprays (100 mcg total) by Each Nostril route once daily.  Dispense: 16 g; Refill: 0  -     dexbrompheniramine-phenyleph (ALAHIST PE) 2-7.5 mg Tab; Take 1 tablet by mouth every 4 to 6 hours as needed (congestion).  Dispense: 20 tablet; Refill: 0      F/U PRN

## 2024-12-04 LAB — DEXAMETHASONE SERPL-MCNC: 376 NG/DL

## 2024-12-10 ENCOUNTER — PATIENT MESSAGE (OUTPATIENT)
Dept: RHEUMATOLOGY | Facility: CLINIC | Age: 19
End: 2024-12-10
Payer: COMMERCIAL

## 2024-12-10 DIAGNOSIS — M05.9 SEROPOSITIVE RHEUMATOID ARTHRITIS: ICD-10-CM

## 2024-12-11 RX ORDER — METHOTREXATE 2.5 MG/1
TABLET ORAL
Qty: 20 TABLET | Refills: 0 | Status: SHIPPED | OUTPATIENT
Start: 2024-12-11

## 2024-12-20 ENCOUNTER — OFFICE VISIT (OUTPATIENT)
Dept: INTERNAL MEDICINE | Facility: CLINIC | Age: 19
End: 2024-12-20
Payer: COMMERCIAL

## 2024-12-20 ENCOUNTER — APPOINTMENT (OUTPATIENT)
Dept: RADIOLOGY | Facility: HOSPITAL | Age: 19
End: 2024-12-20
Attending: INTERNAL MEDICINE
Payer: COMMERCIAL

## 2024-12-20 VITALS
WEIGHT: 238 LBS | HEART RATE: 99 BPM | SYSTOLIC BLOOD PRESSURE: 114 MMHG | OXYGEN SATURATION: 97 % | DIASTOLIC BLOOD PRESSURE: 70 MMHG | BODY MASS INDEX: 37.27 KG/M2 | TEMPERATURE: 96 F

## 2024-12-20 DIAGNOSIS — Z79.899 IMMUNOSUPPRESSION DUE TO DRUG THERAPY: ICD-10-CM

## 2024-12-20 DIAGNOSIS — M05.9 SEROPOSITIVE RHEUMATOID ARTHRITIS: Primary | ICD-10-CM

## 2024-12-20 DIAGNOSIS — F41.9 ANXIETY: ICD-10-CM

## 2024-12-20 DIAGNOSIS — D84.821 IMMUNOSUPPRESSION DUE TO DRUG THERAPY: ICD-10-CM

## 2024-12-20 DIAGNOSIS — M05.9 SEROPOSITIVE RHEUMATOID ARTHRITIS: ICD-10-CM

## 2024-12-20 DIAGNOSIS — K21.9 GASTROESOPHAGEAL REFLUX DISEASE WITHOUT ESOPHAGITIS: ICD-10-CM

## 2024-12-20 PROCEDURE — G2211 COMPLEX E/M VISIT ADD ON: HCPCS | Mod: S$GLB,,, | Performed by: FAMILY MEDICINE

## 2024-12-20 PROCEDURE — 73130 X-RAY EXAM OF HAND: CPT | Mod: TC,50,PN

## 2024-12-20 PROCEDURE — 99215 OFFICE O/P EST HI 40 MIN: CPT | Mod: S$GLB,,, | Performed by: FAMILY MEDICINE

## 2024-12-20 PROCEDURE — 73130 X-RAY EXAM OF HAND: CPT | Mod: 26,50,, | Performed by: RADIOLOGY

## 2024-12-20 PROCEDURE — 99999 PR PBB SHADOW E&M-EST. PATIENT-LVL IV: CPT | Mod: PBBFAC,,, | Performed by: FAMILY MEDICINE

## 2024-12-20 NOTE — PROGRESS NOTES
Subjective:       Patient ID: Lisa De Jesus is a 19 y.o. female.    Chief Complaint: Follow-up (1 Month follow up )    History of Present Illness    CHIEF COMPLAINT:  Ms. De Jesus presents for a follow-up visit to discuss recent weight changes, ongoing joint pain related to rheumatoid arthritis, and various other health concerns including GI issues and headaches.    HPI:  Ms. De Jesus reports significant hunger sensations incongruent with her eating habits over the past year. She describes an episode where, after consuming breakfast (atypical as she usually skips breakfast 5 out of 7 days), she became severely ill with intense hunger sensations, near-syncope, and emesis of saliva. She also had dizziness during this episode. Previously, she could abstain from food all day without discomfort, but now she has severe hunger sensations if she does not eat breakfast or lunch.    Ms. De Jesus discusses ongoing issues with rheumatoid arthritis, particularly affecting her feet and hips. Her rheumatologist observed significant joint space narrowing in these areas. She had foot surgery last December due to bone protrusions causing pain while walking. She is now having spinal issues as well and has an upcoming appointment with her rheumatologist to discuss these concerns.    Ms. De Jesus mentions nightly headaches, especially before bedtime. These appear to be tension-type headaches, possibly related to stress, rather than migraines.    She reports shooting pains in her leg when lying down at night, occurring consistently with leg extension or movement. The pain is in the contralateral leg to the one with known foot issues. She attempts to alleviate the pain by massaging the leg, which provides temporary relief before the pain returns. This discomfort sometimes interferes with her sleep.    Ms. De Jesus had a recent episode of epistaxis while she had an upper respiratory infection, which made it challenging to use her Flonase. The  epistaxis was severe but resolved about a week ago.    Ms. De Jesus denies symptoms of Cushing's syndrome, diabetes, or pre-diabetes.    MEDICAL HISTORY:  Ms. De Jesus has a history of rheumatoid arthritis, acid reflux, tree nut allergy with anaphylaxis, and anxiety.    MEDICATIONS:  Ms. De Jesus is on folic acid and hydroxychloroquine for rheumatoid arthritis. She takes Zyrtec as an antihistamine and methotrexate. Naproxen is used as needed for joint pain. She carries an EpiPen. Ms. De Jesus started Zoloft daily for anxiety in late August. Trazodone is taken as needed for sleep and anxiety. She uses Flonase daily for allergies and clindamycin lotion for acne.    SURGICAL HISTORY:  Ms. De Jesus underwent foot surgery in December of last year. The indication was severe arthritis pain in the foot, with protruding bones causing pain when walking.    TEST RESULTS:  Ms. De Jesus's cortisol level and thyroid labs were normal. Ms. De Jesus completed a Cushing's test, and the results were normal.    ALLERGIES:  Ms. De Jesus has a tree nut allergy that can cause anaphylaxis.        Review of Systems   Constitutional:  Positive for unexpected weight change. Negative for activity change.   HENT:  Positive for rhinorrhea. Negative for hearing loss and trouble swallowing.    Eyes:  Negative for discharge and visual disturbance.   Respiratory:  Negative for chest tightness and wheezing.    Cardiovascular:  Negative for chest pain and palpitations.   Gastrointestinal:  Negative for blood in stool, constipation, diarrhea and vomiting.   Endocrine: Negative for polydipsia and polyuria.   Genitourinary:  Negative for difficulty urinating, dysuria, hematuria and menstrual problem.   Musculoskeletal:  Positive for arthralgias and joint swelling. Negative for neck pain.   Neurological:  Positive for headaches. Negative for weakness.   Psychiatric/Behavioral:  Negative for confusion and dysphoric mood.        Objective:      Physical Exam  Vitals reviewed.    HENT:      Head: Normocephalic and atraumatic.      Nose: No congestion or rhinorrhea.   Eyes:      General: No scleral icterus.        Right eye: No discharge.         Left eye: No discharge.      Extraocular Movements: Extraocular movements intact.      Conjunctiva/sclera: Conjunctivae normal.      Pupils: Pupils are equal, round, and reactive to light.   Cardiovascular:      Rate and Rhythm: Normal rate and regular rhythm.      Heart sounds: No murmur heard.     No friction rub. No gallop.   Pulmonary:      Effort: Pulmonary effort is normal. No respiratory distress.      Breath sounds: Normal breath sounds. No stridor. No wheezing or rhonchi.   Abdominal:      General: Bowel sounds are normal.      Palpations: Abdomen is soft.   Musculoskeletal:      Right lower leg: No edema.      Left lower leg: No edema.   Skin:     General: Skin is warm.   Neurological:      General: No focal deficit present.      Mental Status: She is alert.   Psychiatric:         Mood and Affect: Mood normal.         Behavior: Behavior normal.         Assessment/Plan:       1. Seropositive rheumatoid arthritis    2. Immunosuppression due to drug therapy    3. Gastroesophageal reflux disease without esophagitis    4. Anxiety      Assessment & Plan    IMPRESSION:  - Reviewed recent endocrinology workup ruling out Cushing's syndrome  - Assessed weight management challenges, considering impact of medications (e.g., steroids, Zoloft) and rheumatoid arthritis on weight gain and mobility  - Evaluated reported GI symptoms, likely related to acid reflux exacerbated by weight gain  - Considered alternative anxiety medications (sertraline, Prozac, Wellbutrin) if weight management goals not achieved with current regimen  - Assessed nighttime leg pain, considering positional causes and potential need for further workup if persistent    RHEUMATOID ARTHRITIS:  - Discussed impact of rheumatoid arthritis on joint pain and importance of continued movement  despite discomfort.  - Ms. Askew to engage in low-impact exercises (e.g., chair exercises, Pilates, yoga) for 20-30 minutes, 5 times per week.  - Continued folic acid, hydroxychloroquine for rheumatoid arthritis, methotrexate, naproxen as needed for joint pain.    GASTROESOPHAGEAL REFLUX DISEASE (GERD):  - Explained relationship between weight gain, abdominal pressure, and worsening acid reflux symptoms.  - Recommend maintaining smaller, more frequent meals throughout the day to manage acid reflux and hunger.  - Started famotidine as needed for acid reflux.  - Discontinued omeprazole.    ALLERGY:  - Continued Flonase nasal spray daily, Zyrtec (cetirizine).    GENERALIZED ANXIETY DISORDER:  Chronic, stable, worse with stressed  - Continued Zoloft (sertraline).    TENSION HEADACHE:  - Provided information on tension headaches and their association with stress.  - Recommend implementing stress management techniques to address tension headaches.    ANKLE AND FOOT PAIN:  Concern for possible restless leg syndrome  - Ms. Askew to apply ice or cold pack to leg experiencing nighttime pain.  - Ms. Askew to be mindful of sleep position to potentially alleviate leg pain.  - Contact the office if leg pain worsens or persists despite implementing suggested interventions.    SLEEP MANAGEMENT:  - Educated on potential side effects of combining antihistamines with sleep aids like trazodone.  - Continued trazodone as needed for sleep, not to be taken daily.    FOLLOW-UP:  - Follow up in 3 months.          I spent a total of 57 minutes on the day of the visit.  This includes face to face time and non-face to face time preparing to see the patient (eg, review of tests), obtaining and/or reviewing separately obtained history, documenting clinical information in the electronic or other health record, independently interpreting results and communicating results to the patient/family/caregiver, or care coordinator.         Future  Appointments   Date Time Provider Department Center   1/14/2025  7:00 AM Rubio Linares MD [facility] [place]   2/7/2025 11:30 AM Bebeto Rashid MD [facility] [place]   3/20/2025  8:40 AM Valeria Muniz MD [facility]       Vaelria Muniz MD  [facility] [address]  [place] [address]  [phone]      This note was generated with the assistance of ambient listening technology. Verbal consent was obtained by the patient and accompanying visitor(s) for the recording of patient appointment to facilitate this note. I attest to having reviewed and edited the generated note for accuracy, though some syntax or spelling errors may persist. Please contact the author of this note for any clarification.

## 2025-01-08 DIAGNOSIS — M05.9 SEROPOSITIVE RHEUMATOID ARTHRITIS: ICD-10-CM

## 2025-01-09 RX ORDER — METHOTREXATE 2.5 MG/1
TABLET ORAL
Qty: 20 TABLET | Refills: 0 | Status: SHIPPED | OUTPATIENT
Start: 2025-01-09

## 2025-01-14 ENCOUNTER — OFFICE VISIT (OUTPATIENT)
Dept: RHEUMATOLOGY | Facility: CLINIC | Age: 20
End: 2025-01-14
Payer: COMMERCIAL

## 2025-01-14 DIAGNOSIS — E55.9 VITAMIN D INSUFFICIENCY: ICD-10-CM

## 2025-01-14 DIAGNOSIS — D84.821 IMMUNOSUPPRESSION DUE TO DRUG THERAPY: ICD-10-CM

## 2025-01-14 DIAGNOSIS — Z79.899 IMMUNOSUPPRESSION DUE TO DRUG THERAPY: ICD-10-CM

## 2025-01-14 DIAGNOSIS — Z51.81 MEDICATION MONITORING ENCOUNTER: ICD-10-CM

## 2025-01-14 DIAGNOSIS — M05.9 SEROPOSITIVE RHEUMATOID ARTHRITIS: Primary | ICD-10-CM

## 2025-01-14 DIAGNOSIS — R79.82 CRP ELEVATED: ICD-10-CM

## 2025-01-14 PROBLEM — I73.00 RAYNAUD'S DISEASE WITHOUT GANGRENE: Status: RESOLVED | Noted: 2024-08-07 | Resolved: 2025-01-14

## 2025-01-14 RX ORDER — FOLIC ACID 1 MG/1
1 TABLET ORAL DAILY
Qty: 90 TABLET | Refills: 1 | Status: SHIPPED | OUTPATIENT
Start: 2025-01-14 | End: 2025-07-13

## 2025-01-14 RX ORDER — METHOTREXATE 2.5 MG/1
25 TABLET ORAL
Qty: 40 TABLET | Refills: 3 | Status: SHIPPED | OUTPATIENT
Start: 2025-01-14 | End: 2025-02-13

## 2025-01-14 NOTE — PROGRESS NOTES
The patient location is: [place]  The chief complaint leading to consultation is: SPRA    Visit type: audiovisual    Face to Face time with patient: 30  30 minutes of total time spent on the encounter, which includes face to face time and non-face to face time preparing to see the patient (eg, review of tests), Obtaining and/or reviewing separately obtained history, Documenting clinical information in the electronic or other health record, Independently interpreting results (not separately reported) and communicating results to the patient/family/caregiver, or Care coordination (not separately reported).         Each patient to whom he or she provides medical services by telemedicine is:  (1) informed of the relationship between the physician and patient and the respective role of any other health care provider with respect to management of the patient; and (2) notified that he or she may decline to receive medical services by telemedicine and may withdraw from such care at any time.    Notes:        RHEUMATOLOGY OUTPATIENT CLINIC NOTE    1/14/2025    Attending Rheumatologist: Rubio Linares  Primary Care Provider/Physician Requesting Consultation: Valeria Muniz MD   Chief Complaint/Reason For Consultation:  No chief complaint on file.      Subjective:     Lisa De Jesus is a 19 y.o. Black or  female with SPRA    Adherence w/ HCQ , FA, and  MTX at 10mg q7d.  Reports no efficacy from combination DMARD for chronic fatigue and widespread pain.    Review of Systems   Constitutional:  Positive for malaise/fatigue. Negative for fever.   Eyes:  Negative for redness.   Respiratory:  Negative for cough and shortness of breath.    Cardiovascular:  Negative for chest pain.   Gastrointestinal:  Negative for constipation and diarrhea.   Genitourinary:  Negative for dysuria and hematuria.   Musculoskeletal:  Positive for back pain and joint pain.   Skin:  Negative for rash.   Neurological:  Negative for  headaches.   Endo/Heme/Allergies:  Does not bruise/bleed easily.       Chronic comorbid conditions affecting medical decision making today:  Past Medical History:   Diagnosis Date    Anaphylaxis     to tree nuts    Gastroesophageal reflux disease without esophagitis 08/07/2024    Raynaud's disease without gangrene 08/07/2024     Past Surgical History:   Procedure Laterality Date    FINGER SURGERY Left 2016    5th finger    SESAMOIDECTOMY Right 12/15/2023    Procedure: SESAMOIDECTOMY;  Surgeon: Wojciech Rich DPM;  Location: [facility] OR;  Service: Podiatry;  Laterality: Right;     No family history on file.  Social History     Tobacco Use   Smoking Status Never   Smokeless Tobacco Never       Current Outpatient Medications:     BALCOLTRA 0.1 mg-0.02 mg (21)/36.5 mg(7) Tab, , Disp: , Rfl:     clindamycin (CLEOCIN T) 1 % lotion, Apply topically 2 (two) times daily., Disp: 60 mL, Rfl: 1    EPINEPHrine (EPIPEN) 0.3 mg/0.3 mL AtIn, Inject 0.3 mg into the muscle daily as needed., Disp: , Rfl:     fluticasone propionate (FLONASE) 50 mcg/actuation nasal spray, 2 sprays (100 mcg total) by Each Nostril route once daily., Disp: 16 g, Rfl: 0    folic acid (FOLVITE) 1 MG tablet, Take 1 tablet (1 mg total) by mouth once daily., Disp: 90 tablet, Rfl: 1    hydroxychloroquine (PLAQUENIL) 200 mg tablet, Take 1 tablet (200 mg total) by mouth 2 (two) times daily., Disp: 180 tablet, Rfl: 1    levocetirizine (XYZAL) 5 MG tablet, Take 1 tablet (5 mg total) by mouth every evening., Disp: 30 tablet, Rfl: 0    methotrexate 2.5 MG Tab, TAKE 5 TABLETS BY MOUTH ONCE A WEEK, Disp: 20 tablet, Rfl: 0    naproxen (NAPROSYN) 500 MG tablet, Take 1 tablet by mouth twice daily, Disp: 60 tablet, Rfl: 0    sertraline (ZOLOFT) 50 MG tablet, Take 50 mg by mouth once daily., Disp: , Rfl:     traZODone (DESYREL) 50 MG tablet, Take 25-50 mg by mouth every evening., Disp: , Rfl:      Objective:     There were no vitals filed for this visit.  Physical Exam    Eyes: Conjunctivae are normal.   Pulmonary/Chest: Effort normal. No respiratory distress.   Musculoskeletal:         General: Tenderness present.   Skin: No rash noted.       Reviewed available old and all outside pertinent medical records available.    All lab results personally reviewed and interpreted by me.       ASSESSMENT / PLAN     1. Seropositive rheumatoid arthritis  CDAI: high disease activity.  Failure to MTX and HCQ.  DC HCQ.  Max MTX to 25mg q7d w/ daily FA.  Add TNFi SC, monitor for 30% improvement afer 12w.  C-Reactive Protein    folic acid (FOLVITE) 1 MG tablet    methotrexate 2.5 MG Tab    entanercept (ENBREL) 50 mg/mL injection      2. CRP elevated  C-Reactive Protein      3. Immunosuppression due to drug therapy  CBC Auto Differential      4. Medication monitoring encounter  Hold immunosuppression in event of active infections, need for surgery, or desire for pregnancy.  Comprehensive Metabolic Panel      5. Vitamin D insufficiency  Vit. D level              Visit today included increased complexity associated with the care of the episodic problem Seropositive rheumatoid arthritis  addressed and managing the longitudinal care of the patient due to the serious and/or complex managed problem(s) CRP elevated, Immunosuppression due to drug therapy  .      Rubio Linares M.D.

## 2025-01-18 ENCOUNTER — PATIENT MESSAGE (OUTPATIENT)
Dept: RHEUMATOLOGY | Facility: CLINIC | Age: 20
End: 2025-01-18
Payer: COMMERCIAL

## 2025-02-10 DIAGNOSIS — M05.9 SEROPOSITIVE RHEUMATOID ARTHRITIS: ICD-10-CM

## 2025-02-11 RX ORDER — METHOTREXATE 2.5 MG/1
TABLET ORAL
Qty: 20 TABLET | Refills: 0 | Status: SHIPPED | OUTPATIENT
Start: 2025-02-11

## 2025-02-15 ENCOUNTER — PATIENT MESSAGE (OUTPATIENT)
Dept: RHEUMATOLOGY | Facility: CLINIC | Age: 20
End: 2025-02-15
Payer: COMMERCIAL

## 2025-03-05 ENCOUNTER — PATIENT MESSAGE (OUTPATIENT)
Dept: RHEUMATOLOGY | Facility: CLINIC | Age: 20
End: 2025-03-05
Payer: COMMERCIAL

## 2025-03-18 ENCOUNTER — PATIENT MESSAGE (OUTPATIENT)
Dept: RHEUMATOLOGY | Facility: CLINIC | Age: 20
End: 2025-03-18
Payer: COMMERCIAL

## 2025-03-18 DIAGNOSIS — R51.9 NONINTRACTABLE HEADACHE, UNSPECIFIED CHRONICITY PATTERN, UNSPECIFIED HEADACHE TYPE: Primary | ICD-10-CM

## 2025-03-19 ENCOUNTER — OFFICE VISIT (OUTPATIENT)
Dept: INTERNAL MEDICINE | Facility: CLINIC | Age: 20
End: 2025-03-19
Payer: COMMERCIAL

## 2025-03-19 ENCOUNTER — LAB VISIT (OUTPATIENT)
Dept: LAB | Facility: HOSPITAL | Age: 20
End: 2025-03-19
Payer: COMMERCIAL

## 2025-03-19 VITALS
WEIGHT: 239.88 LBS | HEIGHT: 67 IN | DIASTOLIC BLOOD PRESSURE: 74 MMHG | HEART RATE: 109 BPM | RESPIRATION RATE: 18 BRPM | TEMPERATURE: 97 F | BODY MASS INDEX: 37.65 KG/M2 | OXYGEN SATURATION: 97 % | SYSTOLIC BLOOD PRESSURE: 98 MMHG

## 2025-03-19 DIAGNOSIS — M05.9 SEROPOSITIVE RHEUMATOID ARTHRITIS: ICD-10-CM

## 2025-03-19 DIAGNOSIS — R11.0 CHRONIC NAUSEA: ICD-10-CM

## 2025-03-19 DIAGNOSIS — R11.0 CHRONIC NAUSEA: Primary | ICD-10-CM

## 2025-03-19 PROCEDURE — 99999 PR PBB SHADOW E&M-EST. PATIENT-LVL V: CPT | Mod: PBBFAC,,, | Performed by: FAMILY MEDICINE

## 2025-03-19 PROCEDURE — 87045 FECES CULTURE AEROBIC BACT: CPT | Performed by: FAMILY MEDICINE

## 2025-03-19 PROCEDURE — 87046 STOOL CULTR AEROBIC BACT EA: CPT | Performed by: FAMILY MEDICINE

## 2025-03-19 PROCEDURE — 87449 NOS EACH ORGANISM AG IA: CPT | Performed by: FAMILY MEDICINE

## 2025-03-19 PROCEDURE — 99214 OFFICE O/P EST MOD 30 MIN: CPT | Mod: S$GLB,,, | Performed by: FAMILY MEDICINE

## 2025-03-19 PROCEDURE — 87427 SHIGA-LIKE TOXIN AG IA: CPT | Performed by: FAMILY MEDICINE

## 2025-03-19 PROCEDURE — G2211 COMPLEX E/M VISIT ADD ON: HCPCS | Mod: S$GLB,,, | Performed by: FAMILY MEDICINE

## 2025-03-19 NOTE — PROGRESS NOTES
Subjective:       Patient ID: Lisa De Jesus is a 20 y.o. female.    Chief Complaint: Joint Pain (Elbows and knees)    Patient presents for follow-up.  She has been advised to start a new medication for rheumatoid arthritis.  A prescription for Enbrel was sent to her pharmacy by her rheumatologist however she has now received education on how to self administer this injection.  Patient will request assistance with this today.  Additionally she has complaints of chronic nausea reports issues with bloating despite taking antacid and or PPI.           Review of Systems   Constitutional:  Negative for activity change and unexpected weight change.   HENT:  Negative for hearing loss, rhinorrhea and trouble swallowing.    Eyes:  Negative for discharge and visual disturbance.   Respiratory:  Negative for chest tightness and wheezing.    Cardiovascular:  Negative for chest pain and palpitations.   Gastrointestinal:  Positive for constipation, diarrhea and vomiting. Negative for blood in stool.   Endocrine: Negative for polydipsia and polyuria.   Genitourinary:  Negative for difficulty urinating, dysuria, hematuria and menstrual problem.   Musculoskeletal:  Positive for arthralgias, joint swelling and neck pain.   Neurological:  Positive for weakness and headaches.   Psychiatric/Behavioral:  Negative for confusion and dysphoric mood.        Objective:      Physical Exam  Vitals reviewed.   HENT:      Head: Normocephalic and atraumatic.      Nose: No congestion or rhinorrhea.   Eyes:      General: No scleral icterus.        Right eye: No discharge.         Left eye: No discharge.      Extraocular Movements: Extraocular movements intact.      Conjunctiva/sclera: Conjunctivae normal.      Pupils: Pupils are equal, round, and reactive to light.   Cardiovascular:      Rate and Rhythm: Normal rate and regular rhythm.      Heart sounds: No murmur heard.     No friction rub. No gallop.   Pulmonary:      Effort: Pulmonary effort is  normal. No respiratory distress.      Breath sounds: Normal breath sounds. No stridor. No wheezing or rhonchi.   Abdominal:      General: Bowel sounds are normal.      Palpations: Abdomen is soft.   Musculoskeletal:      Right lower leg: No edema.      Left lower leg: No edema.   Skin:     General: Skin is warm.   Neurological:      General: No focal deficit present.      Mental Status: She is alert.   Psychiatric:         Mood and Affect: Mood normal.         Behavior: Behavior normal.         Assessment/Plan:       1. Chronic nausea      1. Chronic nausea (Primary)  Chronic, uncontrolled, will assess for h.pylori infection, has not taken PPI in some time, will proceed with stool culture and H. Pylori antibody  - H. pylori Antibody, IgG; Future  - Stool culture; Future    2. Seropositive rheumatoid arthritis  Chronic, uncontrolled, reviewed Enbrel patient instruction website with patient and discussed self administration of medication per manufacture guidelines.   - follow up rheumatology as scheduled    RTC 2 months for follow up               Future Appointments   Date Time Provider Department Center   3/25/2025 12:15 PM Bebeto Rashid MD [facility] [place]   3/27/2025  9:00 AM Zafar Kuhn MD [id number]   5/1/2025  8:20 AM Valeria Muniz MD [facility]   5/7/2025 12:00 PM LABORATORY, [facility]   5/14/2025  7:00 AM Rubio Linares MD [facility]       Valeria Muniz MD  [facility] [address]  [place] [address]  [phone]      This note was generated with the assistance of ambient listening technology. Verbal consent was obtained by the patient and accompanying visitor(s) for the recording of patient appointment to facilitate this note. I attest to having reviewed and edited the generated note for accuracy, though some syntax or spelling errors may persist. Please contact the author of this note for any  clarification.

## 2025-03-21 ENCOUNTER — RESULTS FOLLOW-UP (OUTPATIENT)
Dept: INTERNAL MEDICINE | Facility: CLINIC | Age: 20
End: 2025-03-21

## 2025-03-21 LAB
E COLI SXT1 STL QL IA: NEGATIVE
E COLI SXT2 STL QL IA: NEGATIVE

## 2025-03-22 LAB — BACTERIA STL CULT: NORMAL

## 2025-03-24 PROBLEM — R11.0 CHRONIC NAUSEA: Status: ACTIVE | Noted: 2025-03-24

## 2025-03-25 ENCOUNTER — OFFICE VISIT (OUTPATIENT)
Dept: DERMATOLOGY | Facility: CLINIC | Age: 20
End: 2025-03-25
Payer: COMMERCIAL

## 2025-03-25 DIAGNOSIS — L70.0 ACNE VULGARIS: Primary | ICD-10-CM

## 2025-03-25 RX ORDER — DESONIDE 0.5 MG/G
CREAM TOPICAL 2 TIMES DAILY
Qty: 60 G | Refills: 2 | Status: SHIPPED | OUTPATIENT
Start: 2025-03-25

## 2025-03-25 RX ORDER — DOXYCYCLINE HYCLATE 100 MG
100 TABLET ORAL DAILY
Qty: 90 TABLET | Refills: 0 | Status: SHIPPED | OUTPATIENT
Start: 2025-03-25

## 2025-03-26 NOTE — PROGRESS NOTES
The patient location is: home/[place]  The chief complaint leading to consultation is: breakouts on the face    Visit type: audiovisual    Face to Face time with patient: 10mins  10 minutes of total time spent on the encounter, which includes face to face time and non-face to face time preparing to see the patient (eg, review of tests), Obtaining and/or reviewing separately obtained history, Documenting clinical information in the electronic or other health record, Independently interpreting results (not separately reported) and communicating results to the patient/family/caregiver, or Care coordination (not separately reported).         Each patient to whom he or she provides medical services by telemedicine is:  (1) informed of the relationship between the physician and patient and the respective role of any other health care provider with respect to management of the patient; and (2) notified that he or she may decline to receive medical services by telemedicine and may withdraw from such care at any time.    History of Present Illness: The patient presents with chief complaint of recent breakouts on the face. Patient with a history of acne s/p completed course of isotretinoin. Was doing well, until recently when she was started on MTX and Enbrel for her arthritis that has developed. After being on this, she noticed increased dryness, irritation, and breakouts on the face. Reports that the face is very dry and itchy, and has multiple new bumps on the face. Has tried OTC creams and washes with minimal improvement in symptoms.      ROS: Otherwise negative    PE: const/neuro - AAOx3, NAD          Skin -           A/P: 1) Dermatitis/acne - Start desonide cream and doxycycline 100mg daily. Counseled patient on gentle skin care regimen, including need for sensitive soaps/detergents, as well as need for frequent use of sensitize moisturizers.        15

## 2025-03-27 ENCOUNTER — TELEPHONE (OUTPATIENT)
Dept: NEUROLOGY | Facility: CLINIC | Age: 20
End: 2025-03-27

## 2025-03-27 NOTE — TELEPHONE ENCOUNTER
No answer, call was forward to . Msg left informing pt that today's appt has to be rescheduled per Dr. Menjivar and to call back for assistance with rescheduling appt.

## 2025-03-31 ENCOUNTER — OFFICE VISIT (OUTPATIENT)
Dept: INTERNAL MEDICINE | Facility: CLINIC | Age: 20
End: 2025-03-31
Payer: COMMERCIAL

## 2025-03-31 VITALS
WEIGHT: 239.75 LBS | OXYGEN SATURATION: 98 % | HEART RATE: 87 BPM | DIASTOLIC BLOOD PRESSURE: 72 MMHG | HEIGHT: 67 IN | TEMPERATURE: 96 F | BODY MASS INDEX: 37.63 KG/M2 | SYSTOLIC BLOOD PRESSURE: 115 MMHG

## 2025-03-31 DIAGNOSIS — T78.40XA ALLERGIC RASH PRESENT ON EXAMINATION: Primary | ICD-10-CM

## 2025-03-31 PROCEDURE — 99999 PR PBB SHADOW E&M-EST. PATIENT-LVL IV: CPT | Mod: PBBFAC,,, | Performed by: FAMILY MEDICINE

## 2025-03-31 PROCEDURE — 99213 OFFICE O/P EST LOW 20 MIN: CPT | Mod: S$GLB,,, | Performed by: FAMILY MEDICINE

## 2025-03-31 RX ORDER — HYDROXYZINE HYDROCHLORIDE 25 MG/1
25 TABLET, FILM COATED ORAL 3 TIMES DAILY PRN
Qty: 30 TABLET | Refills: 0 | Status: SHIPPED | OUTPATIENT
Start: 2025-03-31 | End: 2025-04-10

## 2025-03-31 RX ORDER — HYDROCORTISONE 25 MG/G
OINTMENT TOPICAL 2 TIMES DAILY
Qty: 20 G | Refills: 0 | Status: SHIPPED | OUTPATIENT
Start: 2025-03-31

## 2025-03-31 NOTE — PROGRESS NOTES
Subjective:       Patient ID: Lisa De Jesus is a 20 y.o. female.    Chief Complaint: Rash (Spent the night at hotel over weekend,woke up with rash all over her face, itchy, bumps on face)    Rash  This is a new problem. The current episode started yesterday. The problem is unchanged. The affected locations include the face. The rash is characterized by dryness, redness, swelling and itchiness. She was exposed to a new detergent/soap. Associated symptoms include eye pain and facial edema. Pertinent negatives include no anorexia, congestion, cough, diarrhea, fatigue, fever, joint pain, nail changes, rhinorrhea, shortness of breath, sore throat or vomiting. Past treatments include anti-itch cream and antihistamine. The treatment provided no relief. Her past medical history is significant for allergies. There is no history of asthma, eczema or varicella.     Review of Systems   Constitutional:  Negative for fatigue and fever.   HENT:  Negative for congestion, rhinorrhea and sore throat.    Eyes:  Positive for pain.   Respiratory:  Negative for cough and shortness of breath.    Gastrointestinal:  Negative for anorexia, diarrhea and vomiting.   Musculoskeletal:  Negative for joint pain.   Skin:  Positive for rash. Negative for nail changes.       Objective:      Physical Exam  Vitals reviewed.   Constitutional:       Appearance: Normal appearance.   HENT:      Head: Normocephalic and atraumatic.   Eyes:      General: No scleral icterus.  Cardiovascular:      Rate and Rhythm: Normal rate.   Pulmonary:      Effort: Pulmonary effort is normal. No respiratory distress.   Skin:     General: Skin is warm.      Findings: Rash (papular rash with erythema on forehead and periocular area) present.   Neurological:      General: No focal deficit present.      Mental Status: She is alert.   Psychiatric:         Mood and Affect: Mood normal.         Behavior: Behavior normal.         Assessment:       1. Allergic rash present on  examination        Plan:   1. Allergic rash present on examination  Acute, patient has other chronic medical condition requiring immunosuppressive therapy will avoid oral/injected corticosteroid due to concern for further immunosuppression/complication, will use topical as alternative to minimize risk and oral antihistamine for itching  -     hydrOXYzine HCL (ATARAX) 25 MG tablet; Take 1 tablet (25 mg total) by mouth 3 (three) times daily as needed for Itching.  Dispense: 30 tablet; Refill: 0  -     hydrocortisone 2.5 % ointment; Apply topically 2 (two) times daily.  Dispense: 20 g; Refill: 0      RTC PRN    Future Appointments   Date Time Provider Department Center   4/1/2025 11:15 AM Wojciech Rich DPM ON POD  Medical C   5/1/2025  8:20 AM Valeria Muniz MD [facility]   5/7/2025 12:00 PM LABORATORY, [facility] [place]   5/14/2025  7:00 AM [facility]       Valeria Muniz MD  [facility] [address]  [place] [address]  [phone]

## 2025-04-03 ENCOUNTER — OFFICE VISIT (OUTPATIENT)
Dept: PODIATRY | Facility: CLINIC | Age: 20
End: 2025-04-03
Payer: COMMERCIAL

## 2025-04-03 VITALS — WEIGHT: 239.63 LBS | HEIGHT: 67 IN | BODY MASS INDEX: 37.61 KG/M2

## 2025-04-03 DIAGNOSIS — M05.9 SEROPOSITIVE RHEUMATOID ARTHRITIS: ICD-10-CM

## 2025-04-03 DIAGNOSIS — M77.51 BURSITIS OF HEEL, RIGHT: ICD-10-CM

## 2025-04-03 DIAGNOSIS — M79.671 PAIN IN RIGHT FOOT: ICD-10-CM

## 2025-04-03 DIAGNOSIS — K21.9 GASTROESOPHAGEAL REFLUX DISEASE WITHOUT ESOPHAGITIS: ICD-10-CM

## 2025-04-03 DIAGNOSIS — M72.2 PLANTAR FASCIITIS OF RIGHT FOOT: Primary | ICD-10-CM

## 2025-04-03 PROCEDURE — 99999 PR PBB SHADOW E&M-EST. PATIENT-LVL III: CPT | Mod: PBBFAC,,, | Performed by: PODIATRIST

## 2025-04-03 PROCEDURE — 99214 OFFICE O/P EST MOD 30 MIN: CPT | Mod: S$GLB,,, | Performed by: PODIATRIST

## 2025-04-03 RX ORDER — CELECOXIB 200 MG/1
200 CAPSULE ORAL DAILY
Qty: 14 CAPSULE | Refills: 0 | Status: SHIPPED | OUTPATIENT
Start: 2025-04-03 | End: 2025-04-17

## 2025-04-03 NOTE — PROGRESS NOTES
Subjective:       Patient ID: Lisa De Jesus is a 20 y.o. female.    Chief Complaint: Foot Pain (Mid foot and outer foot pain on right foot, rate pain 5/10, nondiabetic, pt is wearing crocs )    HPI: Lisa De Jesus complains of moderate pains to the right foot. States pains are sharp and stabbing-like in nature. Pains are to the plantar foot, mostly with walking and standing. Rates the pains at approx. 5/10. States post-static dyskinesia. Denies any recent identifiable trauma. Does limp with gait. No NSAID medications thus far due to MTX usage. Pains have been present for the past several weeks and the pains have worsened over the past couple of days. She does not relate or have a history of plantar fasciitis. States walking and standing causes and/or exacerbates the symptoms. Patient has had no corticosteroid injection(s) to the right foot, prior. Patient's Primary Care Provider is Valeria Muniz MD. Does follow with Rheumatology for RA. Is on MTX Qweekly.     Review of patient's allergies indicates:   Allergen Reactions    Tree nuts Anaphylaxis, Itching, Nausea And Vomiting, Shortness Of Breath and Swelling    Tretinoin Swelling     Facial swelling with retinoid creams        Past Medical History:   Diagnosis Date    Anaphylaxis     to tree nuts    Gastroesophageal reflux disease without esophagitis 08/07/2024    Raynaud's disease without gangrene 08/07/2024       No family history on file.    Social History[1]    Past Surgical History:   Procedure Laterality Date    FINGER SURGERY Left 2016    5th finger    SESAMOIDECTOMY Right 12/15/2023    Procedure: SESAMOIDECTOMY;  Surgeon: Wojciech Rich DPM;  Location: [facility];  Service: Podiatry;  Laterality: Right;       Review of Systems   Constitutional:  Negative for chills, fatigue and fever.   HENT:  Negative for hearing loss.    Eyes:  Negative for photophobia and visual disturbance.   Respiratory:  Negative for cough, chest tightness, shortness of  "breath and wheezing.    Cardiovascular:  Negative for chest pain and palpitations.   Gastrointestinal:  Negative for constipation, diarrhea, nausea and vomiting.   Endocrine: Negative for cold intolerance and heat intolerance.   Genitourinary:  Negative for flank pain.   Musculoskeletal:  Positive for gait problem. Negative for neck pain and neck stiffness.   Neurological:  Negative for light-headedness and headaches.   Psychiatric/Behavioral:  Negative for sleep disturbance.          Objective:   Ht 5' 7" (1.702 m)   Wt 108.7 kg (239 lb 10.2 oz)   BMI 37.53 kg/m²     Physical Exam   LOWER EXTREMITY PHYSICAL EXAMINATION  ORTHOPEDIC: There is moderate tenderness to palpation of the area around the plantar medial calcaneal tubercle on the right foot. Pains are characterized as sharp and stabbing-like with direct palpation of the area. There is also moderate pain to palpation of the immediate plantar aspect of the heel, and mild pains to the lateral band of the fascia. No edema is noted. No fullness is noted. There are no pains to palpation within the plantar fascia at the arch. No defects are noted within the plantar fascia at the arch. No plantar fibromas are noted. No defects are noted within the ligament. Dorsiflexion of the MTPJs with simultaneous palpation of the fascia at the arch, does not worsen and exacerbate the pains. Plantarflexion of the MTPJs with simultaneous palpation of the fascia at the arch, does worsen and exacerbate the pains.  No pains with medial to lateral compression of the heel. Equinus contracture is noted. Antalgic gait pattern is noted.     DERMATOLOGY: Skin is supple, dry and intact.    Assessment:     1. Plantar fasciitis of right foot    2. Bursitis of heel, right    3. Pain in right foot    4. Gastroesophageal reflux disease without esophagitis    5. Seropositive rheumatoid arthritis          Plan:     Plantar fasciitis of right foot  -     celecoxib (CELEBREX) 200 MG capsule; Take 1 " capsule (200 mg total) by mouth once daily. for 14 days  Dispense: 14 capsule; Refill: 0    Bursitis of heel, right  -     celecoxib (CELEBREX) 200 MG capsule; Take 1 capsule (200 mg total) by mouth once daily. for 14 days  Dispense: 14 capsule; Refill: 0    Pain in right foot  -     celecoxib (CELEBREX) 200 MG capsule; Take 1 capsule (200 mg total) by mouth once daily. for 14 days  Dispense: 14 capsule; Refill: 0  -     X-Ray Calcaneus 2 View Right; Future; Expected date: 04/03/2025    Gastroesophageal reflux disease without esophagitis  -     celecoxib (CELEBREX) 200 MG capsule; Take 1 capsule (200 mg total) by mouth once daily. for 14 days  Dispense: 14 capsule; Refill: 0    Seropositive rheumatoid arthritis  -     celecoxib (CELEBREX) 200 MG capsule; Take 1 capsule (200 mg total) by mouth once daily. for 14 days  Dispense: 14 capsule; Refill: 0      Thorough discussion is had with the patient today, concerning the diagnosis, its etiology, and the treatment algorithm at present.     Patient is on MTX Qweekly, at mild dosage.    Start oral Celebrex QD for 10-14 days.    If persistent symptoms, follow up for CSI.          Future Appointments   Date Time Provider Department Center   5/1/2025  8:20 AM Valeria Muniz MD [facility]   5/7/2025 12:00 PM LABORATORY, [facility]   5/14/2025  7:00 AM Rubio Linares MD ON [facility]    fasci         [1]   Social History  Socioeconomic History    Marital status: Single   Tobacco Use    Smoking status: Never    Smokeless tobacco: Never   Substance and Sexual Activity    Alcohol use: Never    Drug use: Never    Sexual activity: Never     Social Drivers of Health     Financial Resource Strain: Low Risk  (3/18/2025)    Overall Financial Resource Strain (CARDIA)     Difficulty of Paying Living Expenses: Not hard at all   Food Insecurity: No Food Insecurity (3/18/2025)    Hunger Vital Sign     Worried About Running Out of Food in the Last Year: Never  true     Ran Out of Food in the Last Year: Never true   Transportation Needs: No Transportation Needs (3/18/2025)    PRAPARE - Transportation     Lack of Transportation (Medical): No     Lack of Transportation (Non-Medical): No   Physical Activity: Insufficiently Active (3/18/2025)    Exercise Vital Sign     Days of Exercise per Week: 2 days     Minutes of Exercise per Session: 30 min   Stress: Stress Concern Present (3/18/2025)    [employer] [place] [employer] - Occupational Stress Questionnaire     Feeling of Stress : To some extent   Housing Stability: Low Risk  (3/18/2025)    Housing Stability Vital Sign     Unable to Pay for Housing in the Last Year: No     Number of Times Moved in the Last Year: 0     Homeless in the Last Year: No

## 2025-04-10 ENCOUNTER — PATIENT MESSAGE (OUTPATIENT)
Dept: RHEUMATOLOGY | Facility: CLINIC | Age: 20
End: 2025-04-10
Payer: COMMERCIAL

## 2025-04-25 RX ORDER — HYDROXYZINE HYDROCHLORIDE 25 MG/1
25 TABLET, FILM COATED ORAL 3 TIMES DAILY
Qty: 90 TABLET | Refills: 0 | Status: SHIPPED | OUTPATIENT
Start: 2025-04-25

## 2025-05-06 ENCOUNTER — PATIENT MESSAGE (OUTPATIENT)
Dept: INTERNAL MEDICINE | Facility: CLINIC | Age: 20
End: 2025-05-06
Payer: COMMERCIAL

## 2025-05-12 ENCOUNTER — LAB VISIT (OUTPATIENT)
Dept: LAB | Facility: HOSPITAL | Age: 20
End: 2025-05-12
Attending: INTERNAL MEDICINE
Payer: COMMERCIAL

## 2025-05-12 DIAGNOSIS — Z79.899 IMMUNOSUPPRESSION DUE TO DRUG THERAPY: ICD-10-CM

## 2025-05-12 DIAGNOSIS — Z51.81 MEDICATION MONITORING ENCOUNTER: ICD-10-CM

## 2025-05-12 DIAGNOSIS — E55.9 VITAMIN D INSUFFICIENCY: ICD-10-CM

## 2025-05-12 DIAGNOSIS — D84.821 IMMUNOSUPPRESSION DUE TO DRUG THERAPY: ICD-10-CM

## 2025-05-12 DIAGNOSIS — R79.82 CRP ELEVATED: ICD-10-CM

## 2025-05-12 DIAGNOSIS — M05.9 SEROPOSITIVE RHEUMATOID ARTHRITIS: ICD-10-CM

## 2025-05-12 LAB
25(OH)D3+25(OH)D2 SERPL-MCNC: 6 NG/ML (ref 30–96)
ABSOLUTE EOSINOPHIL (OHS): 0.23 K/UL
ABSOLUTE MONOCYTE (OHS): 0.75 K/UL (ref 0.3–1)
ABSOLUTE NEUTROPHIL COUNT (OHS): 3.94 K/UL (ref 1.8–7.7)
ALBUMIN SERPL BCP-MCNC: 3.3 G/DL (ref 3.5–5.2)
ALP SERPL-CCNC: 82 UNIT/L (ref 40–150)
ALT SERPL W/O P-5'-P-CCNC: 15 UNIT/L (ref 10–44)
ANION GAP (OHS): 8 MMOL/L (ref 8–16)
AST SERPL-CCNC: 17 UNIT/L (ref 11–45)
BASOPHILS # BLD AUTO: 0.04 K/UL
BASOPHILS NFR BLD AUTO: 0.5 %
BILIRUB SERPL-MCNC: 0.2 MG/DL (ref 0.1–1)
BUN SERPL-MCNC: 9 MG/DL (ref 6–20)
CALCIUM SERPL-MCNC: 8.6 MG/DL (ref 8.7–10.5)
CHLORIDE SERPL-SCNC: 108 MMOL/L (ref 95–110)
CO2 SERPL-SCNC: 22 MMOL/L (ref 23–29)
CREAT SERPL-MCNC: 0.7 MG/DL (ref 0.5–1.4)
CRP SERPL-MCNC: 39.3 MG/L
ERYTHROCYTE [DISTWIDTH] IN BLOOD BY AUTOMATED COUNT: 15.7 % (ref 11.5–14.5)
GFR SERPLBLD CREATININE-BSD FMLA CKD-EPI: >60 ML/MIN/1.73/M2
GLUCOSE SERPL-MCNC: 94 MG/DL (ref 70–110)
HCT VFR BLD AUTO: 40.8 % (ref 37–48.5)
HGB BLD-MCNC: 12.2 GM/DL (ref 12–16)
IMM GRANULOCYTES # BLD AUTO: 0.02 K/UL (ref 0–0.04)
IMM GRANULOCYTES NFR BLD AUTO: 0.2 % (ref 0–0.5)
LYMPHOCYTES # BLD AUTO: 3.46 K/UL (ref 1–4.8)
MCH RBC QN AUTO: 21.7 PG (ref 27–31)
MCHC RBC AUTO-ENTMCNC: 29.9 G/DL (ref 32–36)
MCV RBC AUTO: 73 FL (ref 82–98)
NUCLEATED RBC (/100WBC) (OHS): 0 /100 WBC
PLATELET # BLD AUTO: 345 K/UL (ref 150–450)
PMV BLD AUTO: 10.2 FL (ref 9.2–12.9)
POTASSIUM SERPL-SCNC: 3.9 MMOL/L (ref 3.5–5.1)
PROT SERPL-MCNC: 7.6 GM/DL (ref 6–8.4)
RBC # BLD AUTO: 5.61 M/UL (ref 4–5.4)
RELATIVE EOSINOPHIL (OHS): 2.7 %
RELATIVE LYMPHOCYTE (OHS): 41 % (ref 18–48)
RELATIVE MONOCYTE (OHS): 8.9 % (ref 4–15)
RELATIVE NEUTROPHIL (OHS): 46.7 % (ref 38–73)
SODIUM SERPL-SCNC: 138 MMOL/L (ref 136–145)
WBC # BLD AUTO: 8.44 K/UL (ref 3.9–12.7)

## 2025-05-12 PROCEDURE — 84450 TRANSFERASE (AST) (SGOT): CPT

## 2025-05-12 PROCEDURE — 85025 COMPLETE CBC W/AUTO DIFF WBC: CPT

## 2025-05-12 PROCEDURE — 36415 COLL VENOUS BLD VENIPUNCTURE: CPT | Mod: PN

## 2025-05-12 PROCEDURE — 82306 VITAMIN D 25 HYDROXY: CPT

## 2025-05-12 PROCEDURE — 86140 C-REACTIVE PROTEIN: CPT

## 2025-05-13 ENCOUNTER — OFFICE VISIT (OUTPATIENT)
Dept: PODIATRY | Facility: CLINIC | Age: 20
End: 2025-05-13
Payer: COMMERCIAL

## 2025-05-13 DIAGNOSIS — M77.51 BURSITIS OF HEEL, RIGHT: ICD-10-CM

## 2025-05-13 DIAGNOSIS — E55.9 VITAMIN D DEFICIENCY: Primary | ICD-10-CM

## 2025-05-13 DIAGNOSIS — M72.2 PLANTAR FASCIITIS OF RIGHT FOOT: Primary | ICD-10-CM

## 2025-05-13 DIAGNOSIS — M05.9 SEROPOSITIVE RHEUMATOID ARTHRITIS: ICD-10-CM

## 2025-05-13 DIAGNOSIS — M79.671 PAIN IN RIGHT FOOT: ICD-10-CM

## 2025-05-13 PROCEDURE — 99214 OFFICE O/P EST MOD 30 MIN: CPT | Mod: S$GLB,,, | Performed by: PODIATRIST

## 2025-05-13 PROCEDURE — 99999 PR PBB SHADOW E&M-EST. PATIENT-LVL III: CPT | Mod: PBBFAC,,, | Performed by: PODIATRIST

## 2025-05-13 RX ORDER — ERGOCALCIFEROL 1.25 MG/1
50000 CAPSULE ORAL
Qty: 8 CAPSULE | Refills: 0 | Status: SHIPPED | OUTPATIENT
Start: 2025-05-13 | End: 2025-07-02

## 2025-05-13 RX ORDER — PREDNISONE 10 MG/1
10 TABLET ORAL DAILY
Qty: 5 TABLET | Refills: 0 | Status: SHIPPED | OUTPATIENT
Start: 2025-05-13 | End: 2025-05-14

## 2025-05-13 NOTE — PROGRESS NOTES
Subjective:       Patient ID: Lisa De Jesus is a 20 y.o. female.    Chief Complaint: Foot Pain (Foot and pain inflammation ,  rates pain 10, nondiabetic,  )    HPI: Lisa De Jesus presents to the office today, for follow-up concerning plantar fasciitis of the right foot. At this time, pains are 10/10 and are described as moderate in nature. States analgic gait pattern. States walking and standing is very painful. To this juncture, patient has had no cortisone injections to the right foot. Did complete a 1-2 weeks course of PO NSAIDs. States that s/p the PO NSAIDs, her pains slowly returned. Patient's Primary Care Provider is Valeria Muniz MD.     Review of patient's allergies indicates:   Allergen Reactions    Tree nuts Anaphylaxis, Itching, Nausea And Vomiting, Shortness Of Breath and Swelling    Tretinoin Swelling     Facial swelling with retinoid creams        Past Medical History:   Diagnosis Date    Anaphylaxis     to tree nuts    Gastroesophageal reflux disease without esophagitis 08/07/2024    Raynaud's disease without gangrene 08/07/2024       No family history on file.    Social History[1]    Past Surgical History:   Procedure Laterality Date    FINGER SURGERY Left 2016    5th finger    SESAMOIDECTOMY Right 12/15/2023    Procedure: SESAMOIDECTOMY;  Surgeon: Wojciech Rich DPM;  Location: [facility];  Service: Podiatry;  Laterality: Right;       Review of Systems   Constitutional:  Negative for chills, fatigue and fever.   HENT:  Negative for hearing loss.    Eyes:  Negative for photophobia and visual disturbance.   Respiratory:  Negative for cough, chest tightness, shortness of breath and wheezing.    Cardiovascular:  Negative for chest pain and palpitations.   Gastrointestinal:  Negative for constipation, diarrhea, nausea and vomiting.   Endocrine: Negative for cold intolerance and heat intolerance.   Genitourinary:  Negative for flank pain.   Musculoskeletal:  Positive for gait problem.  Negative for neck pain and neck stiffness.   Neurological:  Negative for light-headedness and headaches.   Psychiatric/Behavioral:  Negative for sleep disturbance.          Objective:   There were no vitals taken for this visit.    Physical Exam    LOWER EXTREMITY PHYSICAL EXAMINATION  NEUROLOGY: Negative Tinel's Sign and negative Valleix Sign. No neurological sensations with compression of the area of Bello's Nerve in the area of the Abductor Hallucis muscle belly.    ORTHOPEDIC: There is moderate tenderness to palpation of the area around the plantar medial calcaneal tubercle on the right foot. Pains are characterized as sharp and stabbing-like with direct palpation of the area. There is also moderate pain to palpation of the immediate plantar aspect of the heel, and mild pains to the lateral band of the fascia. No edema is noted. No fullness is noted. There are mild to moderate pains to palpation within the plantar fascia at the arch. No defects are noted within the plantar fascia at the arch. No plantar fibromas are noted. No defects are noted within the ligament. Dorsiflexion of the MTPJs with simultaneous palpation of the fascia at the arch, does not worsen and exacerbate the pains. Plantarflexion of the MTPJs with simultaneous palpation of the fascia at the arch, does worsen and exacerbate the pains.  No pains with medial to lateral compression of the heel. Equinus contracture is noted. Mildly antalgic gait pattern is noted.     Assessment:     1. Plantar fasciitis of right foot    2. Bursitis of heel, right    3. Pain in right foot    4. Seropositive rheumatoid arthritis        Plan:     Plantar fasciitis of right foot  -     predniSONE (DELTASONE) 10 MG tablet; Take 1 tablet (10 mg total) by mouth once daily. for 5 days  Dispense: 5 tablet; Refill: 0    Bursitis of heel, right  -     predniSONE (DELTASONE) 10 MG tablet; Take 1 tablet (10 mg total) by mouth once daily. for 5 days  Dispense: 5 tablet;  Refill: 0    Pain in right foot  -     predniSONE (DELTASONE) 10 MG tablet; Take 1 tablet (10 mg total) by mouth once daily. for 5 days  Dispense: 5 tablet; Refill: 0    Seropositive rheumatoid arthritis      Thorough discussion is had with the patient today, concerning the diagnosis, its etiology, and the treatment algorithm at present.     Will see Rheumatology on tomorrow and is interested in D/Cing MTX and biologic(s).    Completed 10-14 days of PO NSAID w/o ailment/complication.    Discussed CSI to the RLE heel, but patient denies at this time.    Rx PO steroid for 5 days.    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    Patient should ambulate with proper and supportive shoe gear.  These are shoes with firm and robust arch support; medial counter.  Shoes which only bend at the metatarsophalangeal joint and which are rigid in the midfoot and hindfoot. Running type or cross training type shoes gear which are designed for pronation control is best.     If MTX is D/Cd, will Rx 5 days of Toradol with PO Voltaren to be used thereafter, with Tramadol for prn usage.            Future Appointments   Date Time Provider Department Center   5/14/2025  7:00 AM Rubio Linares MD [facility]   5/14/2025  8:40 AM Valeria Muniz MD [facility] [place]            [1]   Social History  Socioeconomic History    Marital status: Single   Tobacco Use    Smoking status: Never    Smokeless tobacco: Never   Substance and Sexual Activity    Alcohol use: Never    Drug use: Never    Sexual activity: Never     Social Drivers of Health     Financial Resource Strain: Low Risk  (3/18/2025)    Overall Financial Resource Strain (CARDIA)     Difficulty of Paying Living Expenses: Not hard at all   Food Insecurity: No Food Insecurity (3/18/2025)    Hunger Vital Sign     Worried About Running Out of Food in the Last Year: Never true     Ran Out of Food in the Last Year:  Never true   Transportation Needs: No Transportation Needs (3/18/2025)    PRAPARE - Transportation     Lack of Transportation (Medical): No     Lack of Transportation (Non-Medical): No   Physical Activity: Insufficiently Active (3/18/2025)    Exercise Vital Sign     Days of Exercise per Week: 2 days     Minutes of Exercise per Session: 30 min   Stress: Stress Concern Present (3/18/2025)    [employer] [place] [employer] - Occupational Stress Questionnaire     Feeling of Stress : To some extent   Housing Stability: Low Risk  (3/18/2025)    Housing Stability Vital Sign     Unable to Pay for Housing in the Last Year: No     Number of Times Moved in the Last Year: 0     Homeless in the Last Year: No

## 2025-05-14 ENCOUNTER — PATIENT MESSAGE (OUTPATIENT)
Dept: PODIATRY | Facility: CLINIC | Age: 20
End: 2025-05-14
Payer: COMMERCIAL

## 2025-05-14 ENCOUNTER — OFFICE VISIT (OUTPATIENT)
Dept: RHEUMATOLOGY | Facility: CLINIC | Age: 20
End: 2025-05-14
Payer: COMMERCIAL

## 2025-05-14 DIAGNOSIS — R52 BREAKTHROUGH PAIN: ICD-10-CM

## 2025-05-14 DIAGNOSIS — Z51.81 MEDICATION MONITORING ENCOUNTER: ICD-10-CM

## 2025-05-14 DIAGNOSIS — M05.9 SEROPOSITIVE RHEUMATOID ARTHRITIS: Primary | ICD-10-CM

## 2025-05-14 DIAGNOSIS — Z79.899 IMMUNOSUPPRESSION DUE TO DRUG THERAPY: ICD-10-CM

## 2025-05-14 DIAGNOSIS — R79.82 CRP ELEVATED: ICD-10-CM

## 2025-05-14 DIAGNOSIS — I73.00 RAYNAUD'S DISEASE WITHOUT GANGRENE: ICD-10-CM

## 2025-05-14 DIAGNOSIS — E55.9 VITAMIN D DEFICIENCY: ICD-10-CM

## 2025-05-14 DIAGNOSIS — D84.821 IMMUNOSUPPRESSION DUE TO DRUG THERAPY: ICD-10-CM

## 2025-05-14 PROCEDURE — 98007 SYNCH AUDIO-VIDEO EST HI 40: CPT | Mod: 95,,, | Performed by: INTERNAL MEDICINE

## 2025-05-14 PROCEDURE — G2211 COMPLEX E/M VISIT ADD ON: HCPCS | Mod: 95,,, | Performed by: INTERNAL MEDICINE

## 2025-05-14 RX ORDER — CELECOXIB 100 MG/1
100 CAPSULE ORAL 2 TIMES DAILY PRN
Qty: 28 CAPSULE | Refills: 1 | Status: SHIPPED | OUTPATIENT
Start: 2025-05-14 | End: 2025-05-28

## 2025-05-14 NOTE — PROGRESS NOTES
The patient location is: [place]  The chief complaint leading to consultation is: SPRA    Visit type: audiovisual    Face to Face time with patient: 30  45 minutes of total time spent on the encounter, which includes face to face time and non-face to face time preparing to see the patient (eg, review of tests), Obtaining and/or reviewing separately obtained history, Documenting clinical information in the electronic or other health record, Independently interpreting results (not separately reported) and communicating results to the patient/family/caregiver, or Care coordination (not separately reported).         Each patient to whom he or she provides medical services by telemedicine is:  (1) informed of the relationship between the physician and patient and the respective role of any other health care provider with respect to management of the patient; and (2) notified that he or she may decline to receive medical services by telemedicine and may withdraw from such care at any time.    Notes:      RHEUMATOLOGY OUTPATIENT CLINIC NOTE    5/14/2025    Attending Rheumatologist: Rubio Linares  Primary Care Provider/Physician Requesting Consultation: aVleria Muniz MD   Chief Complaint/Reason For Consultation:  No chief complaint on file.      Subjective:     Lisa De Jesus is a 20 y.o. Black or  female with SPRA    TNFi SC added to regimen of MTC po at 15mg q7d.  On Enbrel for the past 2m.  Refers would prefer to DC current therapy given no results and recurrence of side effects upon intake: malaise and paradoxic sicca w/ MTX use, local site reaction with Enbrel.    Review of Systems   Constitutional:  Positive for malaise/fatigue. Negative for fever.   Eyes:  Negative for photophobia and pain.   Respiratory:  Negative for shortness of breath.    Cardiovascular:  Negative for chest pain.   Gastrointestinal:  Negative for melena.   Genitourinary:  Negative for dysuria, hematuria and urgency.    Musculoskeletal:  Positive for back pain and joint pain.   Skin:  Negative for rash.   Neurological:  Negative for focal weakness.       Chronic comorbid conditions affecting medical decision making today:  Past Medical History:   Diagnosis Date    Anaphylaxis     to tree nuts    Gastroesophageal reflux disease without esophagitis 08/07/2024    Raynaud's disease without gangrene 08/07/2024     Past Surgical History:   Procedure Laterality Date    FINGER SURGERY Left 2016    5th finger    SESAMOIDECTOMY Right 12/15/2023    Procedure: SESAMOIDECTOMY;  Surgeon: Wojciech Rich DPM;  Location: [facility];  Service: Podiatry;  Laterality: Right;     No family history on file.  Tobacco Use History[1]  Current Medications[2]     Objective:     There were no vitals filed for this visit.  Physical Exam   Pulmonary/Chest: Effort normal. No respiratory distress.   Musculoskeletal:         General: Tenderness present. No swelling.   Skin: No rash noted.       Reviewed available old and all outside pertinent medical records available.    All lab results personally reviewed and interpreted by me.       ASSESSMENT / PLAN     1. Seropositive rheumatoid arthritis  CDAI: high disease activity.  Failure to methotrexate and Enbrel.  Patient wishes for reactive approach: NM scan to evaluate for subclinical synovitis that may respond to alternate biologic.  NM Joint Scan Whole Body    C-Reactive Protein    Cyclic Citrullinated Peptide Antibody, IgG    Rheumatoid Factor      2. CRP elevated  NM Joint Scan Whole Body    C-Reactive Protein      3. Raynaud's disease without gangrene  Negative EBONI on file.  Avoid precipitating factors.    Monitor need for  CCB PRN      4. Breakthrough pain  celecoxib (CELEBREX) 100 MG capsule      5. Vitamin D deficiency  May lead to elevation of acute phase reactants.  Follow replacement course w/ OTC supp 2000 IU QD      6. Immunosuppression due to drug therapy  Hold immunosuppression in event of active  infections or need for surgery.  CBC Auto Differential      7. Medication monitoring encounter  Comprehensive Metabolic Panel              Visit today included increased complexity associated with the care of the episodic problem Seropositive rheumatoid arthritis addressed and managing the longitudinal care of the patient due to the serious and/or complex managed problem(s) immunosuppression due to drug therapy.    Rubio Linares M.D.           [1]   Social History  Tobacco Use   Smoking Status Never   Smokeless Tobacco Never   [2]   Current Outpatient Medications:     BALCOLTRA 0.1 mg-0.02 mg (21)/36.5 mg(7) Tab, , Disp: , Rfl:     clindamycin (CLEOCIN T) 1 % lotion, Apply topically 2 (two) times daily., Disp: 60 mL, Rfl: 1    desonide (DESOWEN) 0.05 % cream, Apply topically 2 (two) times daily., Disp: 60 g, Rfl: 2    doxycycline (VIBRA-TABS) 100 MG tablet, Take 1 tablet (100 mg total) by mouth once daily., Disp: 90 tablet, Rfl: 0    entanercept (ENBREL) 50 mg/mL injection, Inject 1 mL (50 mg total) into the skin once a week., Disp: 12 mL, Rfl: 1    EPINEPHrine (EPIPEN) 0.3 mg/0.3 mL AtIn, Inject 0.3 mg into the muscle daily as needed., Disp: , Rfl:     ergocalciferol (ERGOCALCIFEROL) 50,000 unit Cap, Take 1 capsule (50,000 Units total) by mouth every 7 days. for 8 doses, Disp: 8 capsule, Rfl: 0    fluticasone propionate (FLONASE) 50 mcg/actuation nasal spray, 2 sprays (100 mcg total) by Each Nostril route once daily., Disp: 16 g, Rfl: 0    folic acid (FOLVITE) 1 MG tablet, Take 1 tablet (1 mg total) by mouth once daily., Disp: 90 tablet, Rfl: 1    hydrocortisone 2.5 % ointment, Apply topically 2 (two) times daily., Disp: 20 g, Rfl: 0    hydrOXYzine HCL (ATARAX) 25 MG tablet, Take 1 tablet (25 mg total) by mouth 3 (three) times daily., Disp: 90 tablet, Rfl: 0    levocetirizine (XYZAL) 5 MG tablet, Take 1 tablet (5 mg total) by mouth every evening., Disp: 30 tablet, Rfl: 0    methotrexate 2.5 MG Tab, TAKE 5 TABLETS  BY MOUTH ONCE A WEEK, Disp: 20 tablet, Rfl: 0    naproxen (NAPROSYN) 500 MG tablet, Take 1 tablet by mouth twice daily, Disp: 60 tablet, Rfl: 0    predniSONE (DELTASONE) 10 MG tablet, Take 1 tablet (10 mg total) by mouth once daily. for 5 days, Disp: 5 tablet, Rfl: 0    sertraline (ZOLOFT) 50 MG tablet, Take 50 mg by mouth once daily., Disp: , Rfl:     traZODone (DESYREL) 50 MG tablet, Take 25-50 mg by mouth every evening., Disp: , Rfl:

## 2025-05-19 ENCOUNTER — PATIENT MESSAGE (OUTPATIENT)
Dept: DERMATOLOGY | Facility: CLINIC | Age: 20
End: 2025-05-19
Payer: COMMERCIAL

## 2025-05-21 NOTE — TELEPHONE ENCOUNTER
It should be fine for her to get microneedling, as it shouldn't impair wound healing. They may want to do a test spot first and see how she does before treating the entire area.

## 2025-06-02 ENCOUNTER — HOSPITAL ENCOUNTER (OUTPATIENT)
Dept: RADIOLOGY | Facility: HOSPITAL | Age: 20
Discharge: HOME | End: 2025-06-02
Attending: INTERNAL MEDICINE
Payer: COMMERCIAL

## 2025-06-02 DIAGNOSIS — R79.82 CRP ELEVATED: ICD-10-CM

## 2025-06-02 DIAGNOSIS — M05.9 SEROPOSITIVE RHEUMATOID ARTHRITIS: ICD-10-CM

## 2025-06-02 PROCEDURE — A9512 TC99M PERTECHNETATE: HCPCS | Performed by: INTERNAL MEDICINE

## 2025-06-02 PROCEDURE — 78306 BONE IMAGING WHOLE BODY: CPT | Mod: TC

## 2025-06-02 PROCEDURE — 78306 BONE IMAGING WHOLE BODY: CPT | Mod: 26,,, | Performed by: RADIOLOGY

## 2025-06-02 RX ADMIN — TECHNETIUM TC-99M 15 MILLICURIE: 14 INJECTION, SOLUTION INTRAVENOUS at 01:06

## 2025-06-03 ENCOUNTER — RESULTS FOLLOW-UP (OUTPATIENT)
Dept: RHEUMATOLOGY | Facility: CLINIC | Age: 20
End: 2025-06-03

## 2025-07-29 DIAGNOSIS — M05.9 SEROPOSITIVE RHEUMATOID ARTHRITIS: ICD-10-CM

## 2025-07-29 RX ORDER — FOLIC ACID 1 MG/1
1000 TABLET ORAL
Qty: 90 TABLET | Refills: 0 | Status: SHIPPED | OUTPATIENT
Start: 2025-07-29

## 2025-08-19 ENCOUNTER — PATIENT MESSAGE (OUTPATIENT)
Dept: PODIATRY | Facility: CLINIC | Age: 20
End: 2025-08-19
Payer: COMMERCIAL

## (undated) DEVICE — PAD CAST SPECIALIST STRL 4

## (undated) DEVICE — BLADE SURG #15 CARBON STEEL

## (undated) DEVICE — BOOT WALKER ROCKER MEDIUM

## (undated) DEVICE — APPLICATOR CHLORAPREP ORN 26ML

## (undated) DEVICE — ELECTRODE REM PLYHSV RETURN 9

## (undated) DEVICE — BANDAGE ROLL COTTN 4.5INX4.1YD

## (undated) DEVICE — BANDAGE ESMARK ELASTIC ST 4X9

## (undated) DEVICE — SUT VICRYL CTD 2-0 GI 27 SH

## (undated) DEVICE — TOWEL OR DISP STRL BLUE 4/PK

## (undated) DEVICE — SUT ETHILON 3-0 PS2 18 BLK

## (undated) DEVICE — DRESSING N ADH OIL EMUL 3X3

## (undated) DEVICE — SUT VICRYL PLUS 0 CT1 36IN

## (undated) DEVICE — DRESSING XEROFORM NONADH 1X8IN

## (undated) DEVICE — PACK BASIC SETUP SC BR

## (undated) DEVICE — UNDERGLOVES BIOGEL PI SIZE 8

## (undated) DEVICE — MANIFOLD 4 PORT

## (undated) DEVICE — COVER LIGHT HANDLE 80/CA

## (undated) DEVICE — BANDAGE MATRIX HK LOOP 4IN 5YD

## (undated) DEVICE — DRAPE U SPLIT SHEET 54X76IN

## (undated) DEVICE — SHOE POST OP MENS SMALL

## (undated) DEVICE — SPONGE COTTON TRAY 4X4IN

## (undated) DEVICE — SYR LUER LOCK STERILE 10ML

## (undated) DEVICE — GLOVE SURG BIOGEL LATEX SZ 7.5